# Patient Record
Sex: MALE | Race: WHITE | NOT HISPANIC OR LATINO | Employment: FULL TIME | URBAN - METROPOLITAN AREA
[De-identification: names, ages, dates, MRNs, and addresses within clinical notes are randomized per-mention and may not be internally consistent; named-entity substitution may affect disease eponyms.]

---

## 2017-08-18 ENCOUNTER — CLINICAL SUPPORT (OUTPATIENT)
Dept: URGENT CARE | Facility: CLINIC | Age: 22
End: 2017-08-18
Payer: COMMERCIAL

## 2017-08-18 DIAGNOSIS — Z23 IMMUNIZATION DUE: Primary | ICD-10-CM

## 2017-08-18 PROCEDURE — 90746 HEPB VACCINE 3 DOSE ADULT IM: CPT | Mod: S$GLB,,,

## 2017-09-04 ENCOUNTER — HOSPITAL ENCOUNTER (EMERGENCY)
Facility: HOSPITAL | Age: 22
Discharge: HOME | End: 2017-09-04
Attending: EMERGENCY MEDICINE
Payer: COMMERCIAL

## 2017-09-04 VITALS
SYSTOLIC BLOOD PRESSURE: 129 MMHG | HEART RATE: 98 BPM | RESPIRATION RATE: 18 BRPM | OXYGEN SATURATION: 98 % | DIASTOLIC BLOOD PRESSURE: 59 MMHG | HEIGHT: 70 IN | TEMPERATURE: 99 F | BODY MASS INDEX: 22.9 KG/M2 | WEIGHT: 160 LBS

## 2017-09-04 DIAGNOSIS — L05.01 PILONIDAL CYST WITH ABSCESS: Primary | ICD-10-CM

## 2017-09-04 PROCEDURE — 25000003 PHARM REV CODE 250: Performed by: PHYSICIAN ASSISTANT

## 2017-09-04 PROCEDURE — 87070 CULTURE OTHR SPECIMN AEROBIC: CPT

## 2017-09-04 PROCEDURE — 10080 I&D PILONIDAL CYST SIMPLE: CPT

## 2017-09-04 PROCEDURE — 99284 EMERGENCY DEPT VISIT MOD MDM: CPT | Mod: 25

## 2017-09-04 RX ORDER — SULFAMETHOXAZOLE AND TRIMETHOPRIM 800; 160 MG/1; MG/1
1 TABLET ORAL 2 TIMES DAILY
Qty: 15 TABLET | Refills: 0 | Status: SHIPPED | OUTPATIENT
Start: 2017-09-04 | End: 2017-09-11

## 2017-09-04 RX ORDER — ONDANSETRON 4 MG/1
4 TABLET, ORALLY DISINTEGRATING ORAL EVERY 6 HOURS PRN
Qty: 10 TABLET | Refills: 0 | OUTPATIENT
Start: 2017-09-04 | End: 2020-01-02

## 2017-09-04 RX ORDER — ONDANSETRON 4 MG/1
4 TABLET, ORALLY DISINTEGRATING ORAL EVERY 6 HOURS PRN
Qty: 10 TABLET | Refills: 0 | Status: SHIPPED | OUTPATIENT
Start: 2017-09-04 | End: 2017-09-04

## 2017-09-04 RX ORDER — OXYCODONE AND ACETAMINOPHEN 5; 325 MG/1; MG/1
1 TABLET ORAL EVERY 4 HOURS PRN
Qty: 15 TABLET | Refills: 0 | Status: SHIPPED | OUTPATIENT
Start: 2017-09-04

## 2017-09-04 RX ORDER — OXYCODONE AND ACETAMINOPHEN 5; 325 MG/1; MG/1
1 TABLET ORAL
Status: COMPLETED | OUTPATIENT
Start: 2017-09-04 | End: 2017-09-04

## 2017-09-04 RX ORDER — LIDOCAINE HYDROCHLORIDE 10 MG/ML
10 INJECTION INFILTRATION; PERINEURAL
Status: COMPLETED | OUTPATIENT
Start: 2017-09-04 | End: 2017-09-04

## 2017-09-04 RX ADMIN — Medication 5 ML: at 02:09

## 2017-09-04 RX ADMIN — OXYCODONE HYDROCHLORIDE AND ACETAMINOPHEN 1 TABLET: 5; 325 TABLET ORAL at 03:09

## 2017-09-04 RX ADMIN — LIDOCAINE HYDROCHLORIDE 10 ML: 10 INJECTION, SOLUTION INFILTRATION; PERINEURAL at 03:09

## 2017-09-04 NOTE — ED PROVIDER NOTES
Encounter Date: 9/4/2017       History     Chief Complaint   Patient presents with    Abscess     diagnosed with pilonidal cyst between buttocks 2 days ago and told to come to ED if pain increased.  Pain has increased and patient has difficulty sitting or walking.  Denies fever or drainage     Eldon Donald, a 22 y.o. male that presents to the ED for evaluation of possible pilonidal cyst.  He was seen in urgent care facility 2 days ago and informed that he had a pilonidal cyst in this area.  He was instructed to take anti-inflammatories and monitor for any increased swelling, redness or pain.  He states that he has had a significant amount of increased pain to this area with no drainage or fever.  He denies any history of previous pilonidal cyst.  No treatments tried.        The history is provided by the patient.     Review of patient's allergies indicates:  No Known Allergies  Past Medical History:   Diagnosis Date    PUD (peptic ulcer disease)      History reviewed. No pertinent surgical history.  Family History   Problem Relation Age of Onset    Recurrent abdominal pain Father     Cancer Maternal Grandmother      lung    Diabetes Maternal Grandfather      Social History   Substance Use Topics    Smoking status: Former Smoker     Types: Cigarettes    Smokeless tobacco: Former User     Quit date: 6/15/2016    Alcohol use Yes     Review of Systems   Constitutional: Negative for fever.   Respiratory: Negative for shortness of breath.    Cardiovascular: Negative for chest pain.   Gastrointestinal: Negative for nausea.   Skin: Positive for color change.   Allergic/Immunologic: Negative for immunocompromised state.   Neurological: Negative for dizziness.   Hematological: Negative for adenopathy.   Psychiatric/Behavioral: Negative for agitation and confusion.   All other systems reviewed and are negative.      Physical Exam     Initial Vitals [09/04/17 1320]   BP Pulse Resp Temp SpO2   129/79 96 16 98.6 °F (37  °C) 96 %      MAP       95.67         Physical Exam    Nursing note and vitals reviewed.  Constitutional: He appears well-developed and well-nourished.   HENT:   Head: Normocephalic and atraumatic.   Right Ear: External ear normal.   Left Ear: External ear normal.   Nose: Nose normal.   Mouth/Throat: Oropharynx is clear and moist.   Eyes: EOM are normal.   Neck: Normal range of motion. Neck supple.   Cardiovascular: Normal rate and regular rhythm.   Pulmonary/Chest: Breath sounds normal. No respiratory distress.   Abdominal: Soft. Bowel sounds are normal.   Musculoskeletal: Normal range of motion. He exhibits no edema or tenderness.   Lymphadenopathy:     He has no cervical adenopathy.   Neurological: He is alert and oriented to person, place, and time. No cranial nerve deficit.   Skin: Skin is warm and dry. Abscess noted. No rash noted. No erythema.        Psychiatric: He has a normal mood and affect. Thought content normal.         ED Course   I & D - Incision and Drainage  Date/Time: 9/4/2017 5:55 PM  Performed by: UMA WARD  Authorized by: SATHISH JOHANSEN   Consent Done: Yes  Patient identity confirmed: verbally with patient  Type: abscess  Anesthesia: local infiltration    Anesthesia:  Local Anesthetic: lidocaine 1% without epinephrine  Anesthetic total: 5 mL  Patient sedated: no  Scalpel size: 11  Incision type: single straight  Complexity: simple  Drainage: pus and  bloody  Drainage amount: moderate  Wound treatment: incision,  wound left open,  wound packed and  deloculation  Packing material: 1/4 in gauze  Complications: No  Specimens: No  Implants: No        Labs Reviewed - No data to display          Medical Decision Making:   Initial Assessment:   Concern for pilonidal cyst  Differential Diagnosis:   Abscess, pilonidal cyst, folliculitis  ED Management:  Patient presents to ED in mild distress, afebrile and nontoxic.  He is to DPD gluteal cleft area which shows some increased erythema and  fluctuance.  Percocet was given for comfort.  Area was I&D with expression of moderate amount of pus and blood.  Packing was placed.  Patient was instructed to follow-up in 2 days to have packing removed.  He was given information on wound care and will be placed on antibiotics.  Strict return precautions given and patient verbalized understanding.    RX: bactrim, percocet                    ED Course      Clinical Impression:   The encounter diagnosis was Pilonidal cyst with abscess.                           Peyton Lamar PA-C  09/04/17 2755

## 2017-09-06 ENCOUNTER — HOSPITAL ENCOUNTER (EMERGENCY)
Facility: HOSPITAL | Age: 22
Discharge: HOME | End: 2017-09-06
Attending: EMERGENCY MEDICINE
Payer: COMMERCIAL

## 2017-09-06 VITALS
RESPIRATION RATE: 16 BRPM | TEMPERATURE: 99 F | HEIGHT: 70 IN | OXYGEN SATURATION: 99 % | BODY MASS INDEX: 22.9 KG/M2 | WEIGHT: 160 LBS | DIASTOLIC BLOOD PRESSURE: 59 MMHG | SYSTOLIC BLOOD PRESSURE: 115 MMHG | HEART RATE: 68 BPM

## 2017-09-06 DIAGNOSIS — Z51.89 WOUND CHECK, ABSCESS: Primary | ICD-10-CM

## 2017-09-06 LAB — BACTERIA SPEC AEROBE CULT: NORMAL

## 2017-09-06 PROCEDURE — 99281 EMR DPT VST MAYX REQ PHY/QHP: CPT

## 2017-09-06 NOTE — ED NOTES
Pt seen 2 days ago in ED for pilondial cyst.  I & D performed with packing inserted.  Pt returns to ED to have packing removed and area re-assessed.

## 2017-09-06 NOTE — ED PROVIDER NOTES
Encounter Date: 9/6/2017       History     Chief Complaint   Patient presents with    Wound Check     from two days ago with packing        Eldon Donald 22 y.o. afebrile male presented to the ED with mean for wound recheck.  He was seen in this ED 2 days ago with I&D of pilonidal abscess.  He reports moderate decrease in pain and swelling of the area.  He does note some continued drainage from the site.  He denies any fever, chills, pain radiation or adverse reactions to current medication regimen.  He did schedule a follow-up appointment with surgeon which will be in 2 days.      The history is provided by the patient.     Review of patient's allergies indicates:  No Known Allergies  Past Medical History:   Diagnosis Date    PUD (peptic ulcer disease)      History reviewed. No pertinent surgical history.  Family History   Problem Relation Age of Onset    Recurrent abdominal pain Father     Cancer Maternal Grandmother      lung    Diabetes Maternal Grandfather      Social History   Substance Use Topics    Smoking status: Former Smoker     Types: Cigarettes    Smokeless tobacco: Former User     Quit date: 6/15/2016    Alcohol use Yes     Review of Systems   Constitutional: Negative for activity change, chills and fever.   HENT: Negative for sore throat.    Gastrointestinal: Negative for abdominal pain, nausea and vomiting.   Genitourinary: Negative for dysuria, genital sores and testicular pain.   Musculoskeletal: Negative for arthralgias, back pain, gait problem and joint swelling.   Skin: Positive for wound (abscess of the pilonidal region with packing in place). Negative for rash.   Neurological: Negative for weakness and numbness.   Hematological: Negative for adenopathy. Does not bruise/bleed easily.       Physical Exam     Initial Vitals [09/06/17 1411]   BP Pulse Resp Temp SpO2   (!) 115/59 68 16 98.9 °F (37.2 °C) 99 %      MAP       77.67         Physical Exam    Nursing note and vitals  reviewed.  Constitutional: Vital signs are normal. He appears well-developed and well-nourished. He is cooperative.  Non-toxic appearance. He does not appear ill. No distress.   HENT:   Head: Normocephalic and atraumatic.   Eyes: Conjunctivae and lids are normal.   Neck: Normal range of motion.   Cardiovascular: Normal rate.   Pulmonary/Chest: No respiratory distress.   Abdominal: Soft. Normal appearance.   Neurological: He is alert and oriented to person, place, and time. GCS eye subscore is 4. GCS verbal subscore is 5. GCS motor subscore is 6.   Skin: Skin is warm, dry and intact. Abscess noted. No rash noted.        Circled region represents Incision noted to the proximal gluteal cleft with packing in place an moderate purulent material.  Ample opening but as location of abscess to the pilonidal region we will repack.  No extending erythema, fluctuance or abnormalities   Psychiatric: He has a normal mood and affect. His speech is normal and behavior is normal. Thought content normal.         ED Course   Procedures  Labs Reviewed - No data to display     Eldon Weshayy 22 y.o. afebrile male presented to the ED with mean for wound recheck.  He was seen in this ED 2 days ago with I&D of pilonidal abscess.  He reports moderate decrease in pain and swelling of the area.  He does note some continued drainage from the site.  He denies any fever, chills, pain radiation or adverse reactions to current medication regimen.  He did schedule a follow-up appointment with surgeon which will be in 2 days. ROS positive for abscess wound check.  Physical exam reveals patient in no obvious distress Circled region represents Incision noted to the proximal gluteal cleft with packing in place an moderate purulent material.  Ample opening but as location of abscess to the pilonidal region we will repack.  No extending erythema, fluctuance or abnormalities. FROM of all joints on affected extremities, sensation and capillary refill intact.      DDX: abscess, cellulitis, folliculitis    ED management: Packing is replaced in the ED with instructions to continue antibiotics and pain medication as directed.  Encouraged to keep follow-up appointment with Dr. Franks      Impression/Plan: The encounter diagnosis was Wound check, abscess. Patient cautioned on when to return to ED.  Pt. Understands and agrees with current treatment plan                      Attending Attestation:     Physician Attestation Statement for NP/PA:   I discussed this assessment and plan of this patient with the NP/PA, but I did not personally examine the patient. The face to face encounter was performed by the NP/PA.    Other NP/PA Attestation Additions:    History of Present Illness: 22M presents for check of abscess; had pilonidal abscess I&D 2 days ago    Medical Decision Making: Healing well - cont abx                 ED Course      Clinical Impression:   The encounter diagnosis was Wound check, abscess.                           RANDI Gibbs  09/06/17 1554       Magi Cardona MD  09/28/17 1998

## 2017-09-07 ENCOUNTER — OFFICE VISIT (OUTPATIENT)
Dept: NEUROLOGY | Facility: HOSPITAL | Age: 22
End: 2017-09-07
Attending: SURGERY
Payer: COMMERCIAL

## 2017-09-07 VITALS
WEIGHT: 167 LBS | HEART RATE: 64 BPM | TEMPERATURE: 97 F | RESPIRATION RATE: 18 BRPM | HEIGHT: 70 IN | BODY MASS INDEX: 23.91 KG/M2 | DIASTOLIC BLOOD PRESSURE: 58 MMHG | SYSTOLIC BLOOD PRESSURE: 100 MMHG

## 2017-09-07 DIAGNOSIS — L05.92 PILONIDAL SINUS: Primary | ICD-10-CM

## 2017-09-07 PROCEDURE — 99214 OFFICE O/P EST MOD 30 MIN: CPT | Performed by: SURGERY

## 2017-09-07 NOTE — PROGRESS NOTES
"[facility]:  [facility]  A collaboration between [facility] and [facility]      PATIENT: Eldon Donald  MRN: [id number]  DATE: 9/7/2017    Subjective:      Chief Complaint: Follow-up (post op )  started to have back pain 5 days back and came to ER because of worsening pain at the tail pain. ewas drained in ER 3 days ago, here for f/u  Feeling better after drainage  Vitals:   Vitals:    09/07/17 1208   BP: (!) 100/58   Pulse: 64   Resp: 18   Temp: 97.4 °F (36.3 °C)   TempSrc: Oral   Weight: 75.8 kg (167 lb)   Height: 5' 10" (1.778 m)        Karnofsky Score:     Diagnosis: No diagnosis found.     Oncologic History:   Interval History:     Past Medical History:  Past Medical History:   Diagnosis Date    PUD (peptic ulcer disease)        Past Surgical History:  Past Surgical History:   Procedure Laterality Date    polynoidal cyst  09/2017       Family History:  Family History   Problem Relation Age of Onset    Recurrent abdominal pain Father     Cancer Maternal Grandmother      lung    Diabetes Maternal Grandfather        Allergies:  Review of patient's allergies indicates:  No Known Allergies    Medications:  Current Outpatient Prescriptions   Medication Sig Dispense Refill    hyoscyamine (ANASPAZ,LEVSIN) 0.125 mg Tab Take 1 tablet (125 mcg total) by mouth every 6 (six) hours as needed. 90 tablet 6    sulfamethoxazole-trimethoprim 800-160mg (BACTRIM DS) 800-160 mg Tab Take 1 tablet by mouth 2 (two) times daily. 15 tablet 0    ondansetron (ZOFRAN-ODT) 4 MG TbDL Take 1 tablet (4 mg total) by mouth every 6 (six) hours as needed. 10 tablet 0    oxycodone-acetaminophen (PERCOCET) 5-325 mg per tablet Take 1 tablet by mouth every 4 (four) hours as needed for Pain. 15 tablet 0    pantoprazole (PROTONIX) 40 MG tablet Take 1 tablet (40 mg total) by mouth 2 (two) times daily. 60 tablet 4     No current facility-administered " medications for this visit.        Review of Systems   Constitutional: Negative for activity change, appetite change, chills, diaphoresis, fatigue, fever and unexpected weight change.   HENT: Negative for congestion, dental problem, drooling, ear pain, facial swelling, hearing loss, mouth sores, nosebleeds, postnasal drip, rhinorrhea, sinus pain, sinus pressure, sneezing, sore throat, tinnitus and trouble swallowing.    Eyes: Positive for visual disturbance. Negative for photophobia, pain, discharge, redness and itching.   Respiratory: Negative for cough, choking, chest tightness, shortness of breath, wheezing and stridor.    Cardiovascular: Negative for chest pain, palpitations and leg swelling.   Gastrointestinal: Negative for abdominal distention, abdominal pain and anal bleeding.   Endocrine: Negative for cold intolerance, heat intolerance, polydipsia and polyphagia.   Genitourinary: Negative for decreased urine volume, difficulty urinating, dysuria, enuresis, flank pain, frequency, genital sores, hematuria, penile swelling, scrotal swelling, testicular pain and urgency.   Musculoskeletal: Negative for back pain and gait problem.   Allergic/Immunologic: Negative.    Neurological: Negative for tremors, seizures, syncope, facial asymmetry, speech difficulty, weakness, light-headedness, numbness and headaches.   Hematological: Negative.    Psychiatric/Behavioral: Negative.       Objective:      Physical Exam   Constitutional: He is oriented to person, place, and time. He appears well-developed and well-nourished.   HENT:   Head: Atraumatic.   Eyes: EOM are normal.   Neck: Neck supple.   Cardiovascular: Normal rate and regular rhythm.    Pulmonary/Chest: Effort normal and breath sounds normal.   Abdominal: Soft. Bowel sounds are normal.   Musculoskeletal: Normal range of motion.   Pilonidal sinus area looks ok no erytema drianing serous fluid, no more swelling or collection   Neurological: He is alert and oriented  to person, place, and time.   Skin: Skin is warm.      Assessment:       No diagnosis found.    Laboratory Data:  Pilonidal sinus abscesssdr ained    Stable now    Impression:   Plan:         1. Daily shower  2. Dressing changes instructed  3. F/u 3 months    Discussed about pilonidal disease, treatment options    To come if he develops swelling area. Informed him about the recurrence rate of 40%                  MARKO Maria MD, FACS   Associate Professor of Surgery, [facility]   Neuroendocrine Surgery, Hepatic/Pancreatic & General Surgery   [address], [address]   [place] [address]   ph. [phone]; [phone]   fax. [phone]

## 2020-01-02 ENCOUNTER — HOSPITAL ENCOUNTER (EMERGENCY)
Facility: HOSPITAL | Age: 25
Discharge: HOME | End: 2020-01-02
Attending: EMERGENCY MEDICINE
Payer: COMMERCIAL

## 2020-01-02 VITALS
BODY MASS INDEX: 23.34 KG/M2 | OXYGEN SATURATION: 99 % | RESPIRATION RATE: 17 BRPM | DIASTOLIC BLOOD PRESSURE: 81 MMHG | SYSTOLIC BLOOD PRESSURE: 136 MMHG | HEART RATE: 94 BPM | WEIGHT: 163 LBS | HEIGHT: 70 IN | TEMPERATURE: 99 F

## 2020-01-02 DIAGNOSIS — K92.0 HEMATEMESIS WITH NAUSEA: Primary | ICD-10-CM

## 2020-01-02 DIAGNOSIS — R05.9 COUGH: ICD-10-CM

## 2020-01-02 LAB
ANION GAP SERPL CALC-SCNC: 12 MMOL/L (ref 8–16)
BASOPHILS # BLD AUTO: 0.03 K/UL (ref 0–0.2)
BASOPHILS NFR BLD: 0.4 % (ref 0–1.9)
BUN SERPL-MCNC: 8 MG/DL (ref 6–20)
CALCIUM SERPL-MCNC: 9.8 MG/DL (ref 8.7–10.5)
CHLORIDE SERPL-SCNC: 104 MMOL/L (ref 95–110)
CO2 SERPL-SCNC: 25 MMOL/L (ref 23–29)
CREAT SERPL-MCNC: 0.9 MG/DL (ref 0.5–1.4)
DIFFERENTIAL METHOD: ABNORMAL
EOSINOPHIL # BLD AUTO: 0.2 K/UL (ref 0–0.5)
EOSINOPHIL NFR BLD: 2.4 % (ref 0–8)
ERYTHROCYTE [DISTWIDTH] IN BLOOD BY AUTOMATED COUNT: 13.1 % (ref 11.5–14.5)
EST. GFR  (AFRICAN AMERICAN): >60 ML/MIN/1.73 M^2
EST. GFR  (NON AFRICAN AMERICAN): >60 ML/MIN/1.73 M^2
GLUCOSE SERPL-MCNC: 92 MG/DL (ref 70–110)
HCT VFR BLD AUTO: 47.8 % (ref 40–54)
HGB BLD-MCNC: 16.1 G/DL (ref 14–18)
LACTATE SERPL-SCNC: 0.8 MMOL/L (ref 0.5–2.2)
LYMPHOCYTES # BLD AUTO: 2.1 K/UL (ref 1–4.8)
LYMPHOCYTES NFR BLD: 24.3 % (ref 18–48)
MCH RBC QN AUTO: 30.7 PG (ref 27–31)
MCHC RBC AUTO-ENTMCNC: 33.7 G/DL (ref 32–36)
MCV RBC AUTO: 91 FL (ref 82–98)
MONOCYTES # BLD AUTO: 1.6 K/UL (ref 0.3–1)
MONOCYTES NFR BLD: 18.8 % (ref 4–15)
NEUTROPHILS # BLD AUTO: 4.6 K/UL (ref 1.8–7.7)
NEUTROPHILS NFR BLD: 54.1 % (ref 38–73)
PLATELET # BLD AUTO: 250 K/UL (ref 150–350)
PMV BLD AUTO: 10.9 FL (ref 9.2–12.9)
POTASSIUM SERPL-SCNC: 3.6 MMOL/L (ref 3.5–5.1)
RBC # BLD AUTO: 5.25 M/UL (ref 4.6–6.2)
SODIUM SERPL-SCNC: 141 MMOL/L (ref 136–145)
WBC # BLD AUTO: 8.46 K/UL (ref 3.9–12.7)

## 2020-01-02 PROCEDURE — 25000003 PHARM REV CODE 250: Performed by: PHYSICIAN ASSISTANT

## 2020-01-02 PROCEDURE — 85025 COMPLETE CBC W/AUTO DIFF WBC: CPT

## 2020-01-02 PROCEDURE — 99284 EMERGENCY DEPT VISIT MOD MDM: CPT | Mod: 25

## 2020-01-02 PROCEDURE — 83605 ASSAY OF LACTIC ACID: CPT

## 2020-01-02 PROCEDURE — 80048 BASIC METABOLIC PNL TOTAL CA: CPT

## 2020-01-02 RX ORDER — ONDANSETRON 4 MG/1
4 TABLET, ORALLY DISINTEGRATING ORAL EVERY 8 HOURS PRN
Qty: 10 TABLET | Refills: 0 | Status: SHIPPED | OUTPATIENT
Start: 2020-01-02

## 2020-01-02 RX ADMIN — LIDOCAINE HYDROCHLORIDE: 20 SOLUTION ORAL; TOPICAL at 09:01

## 2020-01-03 NOTE — ED TRIAGE NOTES
Pt presents to ED and reports coughing with onset in September with nasal congestion. Pt states he was seen  By ENT. He states he began with hematemesis today. Pt states pt had concerns.

## 2020-01-03 NOTE — ED PROVIDER NOTES
Encounter Date: 1/2/2020       History     Chief Complaint   Patient presents with    Cough     Reports cough and congestion since the end of November. Reports had some hematemesis today prompting him to come to ED for eval.      24-year-old male with history of peptic ulcer disease presents to the ER with chief complaint of vomiting small amount of bright red blood just prior to arrival.  Patient reports that he has been having coughing and dry heaving for 3 months.  He was seen by ENT and put on nasal spray, antibiotics and antacid with temporary improvement of his symptoms. Patient reports chronic vomiting in the morning for as long as I can remember.  He denies any black or bloody stool.  He denies fever, abdominal pain, chest pain or shortness of breath, dizziness, lightheadedness, or any other complaints at this time.        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   HENT: Negative for sore throat.    Respiratory: Positive for cough. Negative for shortness of breath.    Cardiovascular: Negative for chest pain.   Gastrointestinal: Positive for nausea and vomiting. Negative for abdominal pain and blood in stool.   Genitourinary: Negative for dysuria.   Musculoskeletal: Negative for back pain.   Skin: Negative for rash.   Neurological: Negative for dizziness, syncope,  weakness and light-headedness.   Hematological: Does not bruise/bleed easily.   Psychiatric/Behavioral: Negative for confusion.       Physical Exam     Initial Vitals [01/02/20 1836]   BP Pulse Resp Temp SpO2   (!) 127/90 92 18 99.3 °F (37.4 °C) 95 %      MAP       --         Physical Exam    Nursing note and vitals reviewed.  Constitutional: He appears well-developed and well-nourished.   HENT:   Head: Atraumatic.   Nose: Rhinorrhea present. No sinus tenderness.   Mouth/Throat: Oropharynx is clear and moist.   Eyes: Conjunctivae and EOM are normal. Pupils are equal, round, and reactive to light.   Neck: Normal range of motion. Neck supple.   Cardiovascular: Normal rate, regular rhythm and intact distal pulses.   Pulmonary/Chest: Breath sounds normal. No respiratory distress. He has no wheezes. He has no rhonchi. He has no rales.   Abdominal: Soft. Bowel sounds are normal. He exhibits no distension. There is no tenderness. There is no rebound and no guarding.   Neurological: He is alert and oriented to person, place, and time. He has normal strength.   Skin: No rash noted.   Psychiatric: He has a normal mood and affect.         ED Course   Procedures  Labs Reviewed   CBC W/ AUTO DIFFERENTIAL - Abnormal; Notable for the following components:       Result Value    Mono # 1.6 (*)     Mono% 18.8 (*)     All other components within normal limits   BASIC METABOLIC PANEL   LACTIC ACID, PLASMA          Imaging Results          X-Ray Chest PA And Lateral (Final result)  Result time 01/02/20 19:43:49    Final result by Landon Ryan MD (01/02/20 19:43:49)                 Impression:      No active cardiopulmonary disease      Electronically signed by: Landon Ryan MD  Date:    01/02/2020  Time:    19:43             Narrative:    EXAMINATION:  XR CHEST PA AND LATERAL    CLINICAL HISTORY:  Cough    TECHNIQUE:  PA and lateral views of the chest were performed.    COMPARISON:  None    FINDINGS:  Heart size and pulmonary vascularity  are within normal limits.  No hilar or mediastinal enlargement.  Lungs are well expanded and appear free of active disease.  No pleural fluid or pneumothorax.  Soft tissues and osseous structures are unremarkable.                                       APC / Resident Notes:   Patient presents to the ER with chief complaint of 1 episode of hematemesis with small amount of bright red blood just prior to arrival.  No additional vomiting today.  Patient reports chronic intermittent vomiting. He does admit to marijuana use, but states that he discontinued for 9 months and still had vomiting.  I have counseled on cessation of marijuana use.  Patient denies abdominal pain.  He reports cough and nasal congestion ongoing for 2 months.  He is scheduled for follow-up exam with ENT in 4 days.  He was recently treated with nasal spray and antibiotics.  Chest x-ray today is negative for acute or infectious process.  Blood work is unremarkable.  He is hemodynamically stable and lactic acid is normal.  He denies black or bloody stool and I do not suspect bleeding ulcer at this time.  He is given GI cocktail in the ER.  He feels significantly improved.  I will prescribe Zofran to take as needed for nausea and vomiting, I have also advised that he use Maalox and Pepcid over-the-counter.  I have advised close follow-up with PCP and GI for further evaluation as he may need repeat upper GI scope.  He had last GI scope in 2016, there was evidence of healed MW tear at that time and he was placed on protonix at that time.  Patient is comfortable with this plan.  He is given ER return precautions.  I discussed the care this patient with my supervising physician.                            Clinical Impression:       ICD-10-CM ICD-9-CM   1. Hematemesis with nausea K92.0 578.0     787.02   2. Cough R05 786.2                             RANDI Woodson  01/02/20 8329

## 2020-01-03 NOTE — ED PROVIDER NOTES
Sort note: Eldon Donald nontoxic/afebrile 24 y.o.  presented to the ED with c/o cough and congestion since November.  Pt had specks of blood in emesis today which prompted ED visit.  No abd pain. Pmhx of ulcers.     Patient seen and medically screened by Nurse practitioner in Sort process due to ED crowding.  Appropriate tests and/or medications ordered.  Care transferred to an alternate provider when patient was placed in an Exam Room from the [facility] for physical exam, additional orders, and disposition. 6:59 PM. DELICIA Thomas, STEFFANIE  01/02/20 8375

## 2021-04-12 ENCOUNTER — OFFICE VISIT (OUTPATIENT)
Dept: URGENT CARE | Facility: CLINIC | Age: 26
End: 2021-04-12

## 2021-04-12 VITALS
BODY MASS INDEX: 23.62 KG/M2 | OXYGEN SATURATION: 99 % | HEIGHT: 70 IN | SYSTOLIC BLOOD PRESSURE: 109 MMHG | WEIGHT: 165 LBS | DIASTOLIC BLOOD PRESSURE: 72 MMHG | TEMPERATURE: 99 F | HEART RATE: 69 BPM | RESPIRATION RATE: 18 BRPM

## 2021-04-12 DIAGNOSIS — Z20.822 COVID-19 RULED OUT: Primary | ICD-10-CM

## 2021-04-12 DIAGNOSIS — Z20.822 CLOSE EXPOSURE TO COVID-19 VIRUS: ICD-10-CM

## 2021-04-12 LAB
CTP QC/QA: YES
SARS-COV-2 RDRP RESP QL NAA+PROBE: NEGATIVE

## 2021-04-12 PROCEDURE — U0002: ICD-10-PCS | Mod: QW,TIER,S$GLB, | Performed by: NURSE PRACTITIONER

## 2021-04-12 PROCEDURE — 99214 OFFICE O/P EST MOD 30 MIN: CPT | Mod: TIER,S$GLB,, | Performed by: NURSE PRACTITIONER

## 2021-04-12 PROCEDURE — 99214 PR OFFICE/OUTPT VISIT, EST, LEVL IV, 30-39 MIN: ICD-10-PCS | Mod: TIER,S$GLB,, | Performed by: NURSE PRACTITIONER

## 2021-04-12 PROCEDURE — U0002 COVID-19 LAB TEST NON-CDC: HCPCS | Mod: QW,TIER,S$GLB, | Performed by: NURSE PRACTITIONER

## 2021-04-14 ENCOUNTER — CLINICAL SUPPORT (OUTPATIENT)
Dept: URGENT CARE | Facility: CLINIC | Age: 26
End: 2021-04-14

## 2021-04-14 DIAGNOSIS — Z11.52 ENCOUNTER FOR SCREENING LABORATORY TESTING FOR COVID-19 VIRUS: Primary | ICD-10-CM

## 2021-04-14 LAB
CTP QC/QA: YES
SARS-COV-2 RDRP RESP QL NAA+PROBE: NEGATIVE

## 2021-04-14 PROCEDURE — U0002: ICD-10-PCS | Mod: QW,S$GLB,, | Performed by: NURSE PRACTITIONER

## 2021-04-14 PROCEDURE — U0002 COVID-19 LAB TEST NON-CDC: HCPCS | Mod: QW,S$GLB,, | Performed by: NURSE PRACTITIONER

## 2021-04-16 ENCOUNTER — PATIENT MESSAGE (OUTPATIENT)
Dept: RESEARCH | Facility: HOSPITAL | Age: 26
End: 2021-04-16

## 2022-01-27 ENCOUNTER — OFFICE VISIT (OUTPATIENT)
Dept: URGENT CARE | Facility: CLINIC | Age: 27
End: 2022-01-27
Payer: MEDICAID

## 2022-01-27 VITALS
DIASTOLIC BLOOD PRESSURE: 77 MMHG | BODY MASS INDEX: 23.7 KG/M2 | HEART RATE: 100 BPM | TEMPERATURE: 97 F | SYSTOLIC BLOOD PRESSURE: 124 MMHG | WEIGHT: 160 LBS | HEIGHT: 69 IN | OXYGEN SATURATION: 98 % | RESPIRATION RATE: 17 BRPM

## 2022-01-27 DIAGNOSIS — M79.641 RIGHT HAND PAIN: ICD-10-CM

## 2022-01-27 DIAGNOSIS — S69.91XA HAND TRAUMA, RIGHT, INITIAL ENCOUNTER: Primary | ICD-10-CM

## 2022-01-27 PROCEDURE — 1160F RVW MEDS BY RX/DR IN RCRD: CPT | Mod: CPTII,S$GLB,, | Performed by: PHYSICIAN ASSISTANT

## 2022-01-27 PROCEDURE — 3008F BODY MASS INDEX DOCD: CPT | Mod: CPTII,S$GLB,, | Performed by: PHYSICIAN ASSISTANT

## 2022-01-27 PROCEDURE — 73130 X-RAY EXAM OF HAND: CPT | Mod: FY,RT,S$GLB, | Performed by: RADIOLOGY

## 2022-01-27 PROCEDURE — 1160F PR REVIEW ALL MEDS BY PRESCRIBER/CLIN PHARMACIST DOCUMENTED: ICD-10-PCS | Mod: CPTII,S$GLB,, | Performed by: PHYSICIAN ASSISTANT

## 2022-01-27 PROCEDURE — 1159F MED LIST DOCD IN RCRD: CPT | Mod: CPTII,S$GLB,, | Performed by: PHYSICIAN ASSISTANT

## 2022-01-27 PROCEDURE — 3078F PR MOST RECENT DIASTOLIC BLOOD PRESSURE < 80 MM HG: ICD-10-PCS | Mod: CPTII,S$GLB,, | Performed by: PHYSICIAN ASSISTANT

## 2022-01-27 PROCEDURE — 99213 PR OFFICE/OUTPT VISIT, EST, LEVL III, 20-29 MIN: ICD-10-PCS | Mod: S$GLB,,, | Performed by: PHYSICIAN ASSISTANT

## 2022-01-27 PROCEDURE — 99213 OFFICE O/P EST LOW 20 MIN: CPT | Mod: S$GLB,,, | Performed by: PHYSICIAN ASSISTANT

## 2022-01-27 PROCEDURE — 73130 XR HAND COMPLETE 3 VIEW RIGHT: ICD-10-PCS | Mod: FY,RT,S$GLB, | Performed by: RADIOLOGY

## 2022-01-27 PROCEDURE — 3074F SYST BP LT 130 MM HG: CPT | Mod: CPTII,S$GLB,, | Performed by: PHYSICIAN ASSISTANT

## 2022-01-27 PROCEDURE — 3008F PR BODY MASS INDEX (BMI) DOCUMENTED: ICD-10-PCS | Mod: CPTII,S$GLB,, | Performed by: PHYSICIAN ASSISTANT

## 2022-01-27 PROCEDURE — 1159F PR MEDICATION LIST DOCUMENTED IN MEDICAL RECORD: ICD-10-PCS | Mod: CPTII,S$GLB,, | Performed by: PHYSICIAN ASSISTANT

## 2022-01-27 PROCEDURE — 3078F DIAST BP <80 MM HG: CPT | Mod: CPTII,S$GLB,, | Performed by: PHYSICIAN ASSISTANT

## 2022-01-27 PROCEDURE — 3074F PR MOST RECENT SYSTOLIC BLOOD PRESSURE < 130 MM HG: ICD-10-PCS | Mod: CPTII,S$GLB,, | Performed by: PHYSICIAN ASSISTANT

## 2022-01-27 NOTE — PROGRESS NOTES
"Subjective:       Patient ID: Eldon Donald is a 26 y.o. male.    Vitals:  height is 5' 9" (1.753 m) and weight is 72.6 kg (160 lb). His temporal temperature is 97.4 °F (36.3 °C). His blood pressure is 124/77 and his pulse is 100. His respiration is 17 and oxygen saturation is 98%.     Chief Complaint: Hand Injury    Pt reports that about two days he fell down and hold himself with his right hand. He says that he has applied ice and wrapped to keep the sweeling down.     Hand Injury   His dominant hand is their right hand. The incident occurred 5 to 7 days ago. The injury mechanism was a fall. The pain is present in the right hand. The quality of the pain is described as aching, cramping, shooting and stabbing. The pain radiates to the right hand. The pain is at a severity of 9/10. The pain is severe. The pain has been constant since the incident. Pertinent negatives include no numbness. The symptoms are aggravated by movement and palpation. He has tried ice, NSAIDs, rest and immobilization for the symptoms. The treatment provided no relief.       Musculoskeletal: Positive for pain and joint swelling.   Neurological: Negative for numbness.       Objective:      Physical Exam   Constitutional: He is oriented to person, place, and time. He appears well-developed and well-nourished. He is cooperative.  Non-toxic appearance. He does not appear ill. No distress.   HENT:   Head: Normocephalic and atraumatic.   Ears:   Right Ear: Hearing and external ear normal.   Left Ear: Hearing and external ear normal.   Nose: Nose normal. No mucosal edema, rhinorrhea or nasal deformity. No epistaxis. Right sinus exhibits no maxillary sinus tenderness and no frontal sinus tenderness. Left sinus exhibits no maxillary sinus tenderness and no frontal sinus tenderness.   Mouth/Throat: Uvula is midline, oropharynx is clear and moist and mucous membranes are normal. No trismus in the jaw. Normal dentition. No uvula swelling. No posterior " oropharyngeal erythema.   Eyes: Conjunctivae and lids are normal. Right eye exhibits no discharge. Left eye exhibits no discharge. No scleral icterus.   Neck: Trachea normal and phonation normal. Neck supple.   Cardiovascular: Normal rate, regular rhythm, intact distal pulses and normal pulses.   Pulmonary/Chest: Effort normal. No respiratory distress. He has no wheezes.   Abdominal: Normal appearance. He exhibits no distension and no mass. Soft. There is no abdominal tenderness.   Musculoskeletal: Normal range of motion.         General: No edema. Normal range of motion.        Hands:    Neurological: He is alert and oriented to person, place, and time. He exhibits normal muscle tone. Coordination normal.   Skin: Skin is warm, dry, intact, not diaphoretic and not pale.   Psychiatric: He has a normal mood and affect. His speech is normal and behavior is normal. Judgment and thought content normal.   Nursing note and vitals reviewed.    Results for orders placed or performed in visit on 04/14/21   POCT COVID-19 Rapid Screening   Result Value Ref Range    POC Rapid COVID Negative Negative     Acceptable Yes     XR HAND COMPLETE 3 VIEW RIGHT    Result Date: 1/27/2022  EXAMINATION: XR HAND COMPLETE 3 VIEW RIGHT CLINICAL HISTORY: Unspecified injury of right wrist, hand and finger(s), initial encounter TECHNIQUE: PA, lateral, and oblique views of the right hand were performed. COMPARISON: None. FINDINGS: Skeletal structures are intact without acute fracture or dislocation.  Shafts of the 4th and 5th metacarpal show orthopedic plates and screws presumably for old fractures that have healed.  Joint spaces are satisfactory.  Mild soft tissue swelling is present in the metacarpal region.     Soft tissue swelling.  No acute fracture or dislocation.  Orthopedic hardware 4th and 5th metacarpals. Electronically signed by: Kush De León MD Date:    01/27/2022 Time:    16:39         Assessment:       1. Hand  trauma, right, initial encounter    2. Right hand pain          Plan:         Hand trauma, right, initial encounter  -     XR HAND COMPLETE 3 VIEW RIGHT; Future; Expected date: 01/27/2022  -     Ambulatory referral/consult to Orthopedics    Right hand pain  -     Ambulatory referral/consult to Orthopedics     Follow up if symptoms worsen or fail to improve, for F/U with PCP or ED.   Patient Instructions   Patient Education       Hand Pain Discharge Instructions   About this topic   The hand is made up of many small bones. Cartilage covers the ends of the bones to help the joints glide easier. Ligaments are strong bands of tissue that hold your bones together. There are also some muscles and tendons in your hand. These attach to the bones and help move the hand up, down, or sideways. Nerves and blood vessels also run through your hand. There are layers of connective tissue in your hand. The skin on your palm is very thick. Damage or injury to any of these structures can lead to hand pain and problems.           What care is needed at home?   · Ask your doctor what you need to do when you go home. Make sure you ask questions if you do not understand what the doctor says.  · If you were given one, wear the splint or brace to support your hand. You may need to have surgery or get a cast after the swelling goes down.  · Prop your hand on pillows keeping it above the level of your heart. This may help lessen pain and swelling.  · You may want to take medicines like ibuprofen or naproxen for swelling and pain. These are nonsteroidal anti-inflammatory drugs (NSAIDs). You might also have gotten a prescription for stronger pain medicines to take for a short time. If so, be sure to follow the instructions for taking them.  · Ice may help you ease pain and swelling.  ? Place an ice pack or a bag of frozen vegetables wrapped in a towel over the painful part. Never put ice right on the skin. Do not leave the ice on more than 10  to 15 minutes at a time. Use for the first 24 to 48 hours after an injury.  · Heat may be used later but not right away. Heat can make swelling worse. If your doctor tells you to use heat, put a heating pad on your hand for no more than 20 minutes at a time. Never go to sleep with a heating pad on as this can cause burns.  · Change wound dressings if you have an open area. Your doctor will tell you how to do this.  · Take all drugs as ordered by your doctor.  · Do exercises that your doctor or therapist suggests.  · Gently massage the painful area of your hand if there is no new injury there.  What follow-up care is needed?   · Your doctors may ask you to make visits to the office to check on your progress. Be sure to keep these visits.  · Your doctor may send you to physical therapy (PT) or occupational therapy (OT) for treatments and exercises to help you heal faster.   · Your doctor may also send you to a hand specialist or surgeon.  What drugs may be needed?   The doctor may order drugs to:  · Help with pain and swelling, like ibuprofen. These are nonsteroidal anti-inflammatory drugs (NSAIDs).  · Help with pain, such as acetaminophen  · Prevent or fight an infection  · Treat a skin problem  The doctor may give you a shot of an anti-inflammatory drug called a corticosteroid. This will help with swelling. Talk with your doctor about the risks of this shot.  Will physical activity be limited?   You may need to rest your hand for a while. You should not do physical activity that makes your health problem worse. If you work out or play sports, you may not be able to do those things until your health problem gets better. Based on the problem, you may have to go to physical therapy (PT) or occupational therapy (OT) for a few weeks or months. You may have to wear a splint, brace, or cast for a few weeks. If you have surgery, recovery may take about 3 to 6 months before you can go back to normal activities.  What  problems could happen?   · Infection  · Loss of motion  · Loss of finger movement or strength  · Ongoing pain or stiffness   · Long-term disability  · Injury to nerves, blood vessels, or other tissues   · Poor healing  What can be done to prevent this health problem?   · Take rests often when doing something with repeat hand motions. Shake out your hands or rub them during breaks.  · Alternate between activities or tasks using repeat hand motions if possible.  · Do not keep your hand in the same position for long periods of time.  · When picking up heavy objects, use both hands together. Keep your wrists straight.  · Keep your fingers and hand moving, especially if you have arthritis. Not moving can cause stiffness and pain.  · Wear protective equipment when playing sports.  · Follow all safety precautions when running machinery.  · Do not run equipment or machines when tired.  · Do not wear rings when working with machinery.  · Use caution when cutting with knives. Make sure the blades stay sharp. Dull blades can slip and cause injuries.  · Do not approach fighting dogs or animals. Be careful when getting near an animal that you do not know.  When do I need to call the doctor?   · Your hand becomes swollen or starts to hurt more.  · Your cast becomes too tight and uncomfortable, or your fingers turn cold or blue.  · There is a bad smell or drainage coming from your cast.  · You damage your splint or cast.  · Your cast feels too loose.  · You notice a crack in your cast or it becomes soft.  · You have less feeling or movement in your fingers.  · The skin becomes red and irritated around the cast.  Teach Back: Helping You Understand   The Teach Back Method helps you understand the information we are giving you. After you talk with the staff, tell them in your own words what you learned. This helps to make sure the staff has described each thing clearly. It also helps to explain things that may have been confusing.  Before going home, make sure you can do these:  · I can tell you about my pain.  · I can tell you what may help ease my pain.  · I can tell you what I will do if I have numbness or tingling or my hand or fingers are cold or pale.  Last Reviewed Date   2021-09-01  Consumer Information Use and Disclaimer   This information is not specific medical advice and does not replace information you receive from your health care provider. This is only a brief summary of general information. It does NOT include all information about conditions, illnesses, injuries, tests, procedures, treatments, therapies, discharge instructions or life-style choices that may apply to you. You must talk with your health care provider for complete information about your health and treatment options. This information should not be used to decide whether or not to accept your health care providers advice, instructions or recommendations. Only your health care provider has the knowledge and training to provide advice that is right for you.  Copyright   Copyright © 2021 [employer] and its affiliates and/or licensors. All rights reserved.

## 2022-01-27 NOTE — PATIENT INSTRUCTIONS
Patient Education       Hand Pain Discharge Instructions   About this topic   The hand is made up of many small bones. Cartilage covers the ends of the bones to help the joints glide easier. Ligaments are strong bands of tissue that hold your bones together. There are also some muscles and tendons in your hand. These attach to the bones and help move the hand up, down, or sideways. Nerves and blood vessels also run through your hand. There are layers of connective tissue in your hand. The skin on your palm is very thick. Damage or injury to any of these structures can lead to hand pain and problems.           What care is needed at home?   · Ask your doctor what you need to do when you go home. Make sure you ask questions if you do not understand what the doctor says.  · If you were given one, wear the splint or brace to support your hand. You may need to have surgery or get a cast after the swelling goes down.  · Prop your hand on pillows keeping it above the level of your heart. This may help lessen pain and swelling.  · You may want to take medicines like ibuprofen or naproxen for swelling and pain. These are nonsteroidal anti-inflammatory drugs (NSAIDs). You might also have gotten a prescription for stronger pain medicines to take for a short time. If so, be sure to follow the instructions for taking them.  · Ice may help you ease pain and swelling.  ? Place an ice pack or a bag of frozen vegetables wrapped in a towel over the painful part. Never put ice right on the skin. Do not leave the ice on more than 10 to 15 minutes at a time. Use for the first 24 to 48 hours after an injury.  · Heat may be used later but not right away. Heat can make swelling worse. If your doctor tells you to use heat, put a heating pad on your hand for no more than 20 minutes at a time. Never go to sleep with a heating pad on as this can cause burns.  · Change wound dressings if you have an open area. Your doctor will tell you how to  do this.  · Take all drugs as ordered by your doctor.  · Do exercises that your doctor or therapist suggests.  · Gently massage the painful area of your hand if there is no new injury there.  What follow-up care is needed?   · Your doctors may ask you to make visits to the office to check on your progress. Be sure to keep these visits.  · Your doctor may send you to physical therapy (PT) or occupational therapy (OT) for treatments and exercises to help you heal faster.   · Your doctor may also send you to a hand specialist or surgeon.  What drugs may be needed?   The doctor may order drugs to:  · Help with pain and swelling, like ibuprofen. These are nonsteroidal anti-inflammatory drugs (NSAIDs).  · Help with pain, such as acetaminophen  · Prevent or fight an infection  · Treat a skin problem  The doctor may give you a shot of an anti-inflammatory drug called a corticosteroid. This will help with swelling. Talk with your doctor about the risks of this shot.  Will physical activity be limited?   You may need to rest your hand for a while. You should not do physical activity that makes your health problem worse. If you work out or play sports, you may not be able to do those things until your health problem gets better. Based on the problem, you may have to go to physical therapy (PT) or occupational therapy (OT) for a few weeks or months. You may have to wear a splint, brace, or cast for a few weeks. If you have surgery, recovery may take about 3 to 6 months before you can go back to normal activities.  What problems could happen?   · Infection  · Loss of motion  · Loss of finger movement or strength  · Ongoing pain or stiffness   · Long-term disability  · Injury to nerves, blood vessels, or other tissues   · Poor healing  What can be done to prevent this health problem?   · Take rests often when doing something with repeat hand motions. Shake out your hands or rub them during breaks.  · Alternate between activities  or tasks using repeat hand motions if possible.  · Do not keep your hand in the same position for long periods of time.  · When picking up heavy objects, use both hands together. Keep your wrists straight.  · Keep your fingers and hand moving, especially if you have arthritis. Not moving can cause stiffness and pain.  · Wear protective equipment when playing sports.  · Follow all safety precautions when running machinery.  · Do not run equipment or machines when tired.  · Do not wear rings when working with machinery.  · Use caution when cutting with knives. Make sure the blades stay sharp. Dull blades can slip and cause injuries.  · Do not approach fighting dogs or animals. Be careful when getting near an animal that you do not know.  When do I need to call the doctor?   · Your hand becomes swollen or starts to hurt more.  · Your cast becomes too tight and uncomfortable, or your fingers turn cold or blue.  · There is a bad smell or drainage coming from your cast.  · You damage your splint or cast.  · Your cast feels too loose.  · You notice a crack in your cast or it becomes soft.  · You have less feeling or movement in your fingers.  · The skin becomes red and irritated around the cast.  Teach Back: Helping You Understand   The Teach Back Method helps you understand the information we are giving you. After you talk with the staff, tell them in your own words what you learned. This helps to make sure the staff has described each thing clearly. It also helps to explain things that may have been confusing. Before going home, make sure you can do these:  · I can tell you about my pain.  · I can tell you what may help ease my pain.  · I can tell you what I will do if I have numbness or tingling or my hand or fingers are cold or pale.  Last Reviewed Date   2021-09-01  Consumer Information Use and Disclaimer   This information is not specific medical advice and does not replace information you receive from your health  care provider. This is only a brief summary of general information. It does NOT include all information about conditions, illnesses, injuries, tests, procedures, treatments, therapies, discharge instructions or life-style choices that may apply to you. You must talk with your health care provider for complete information about your health and treatment options. This information should not be used to decide whether or not to accept your health care providers advice, instructions or recommendations. Only your health care provider has the knowledge and training to provide advice that is right for you.  Copyright   Copyright © 2021 [employer] and its affiliates and/or licensors. All rights reserved.

## 2023-07-31 ENCOUNTER — HOSPITAL ENCOUNTER (EMERGENCY)
Facility: HOSPITAL | Age: 28
Discharge: HOME | End: 2023-07-31
Attending: EMERGENCY MEDICINE
Payer: MEDICAID

## 2023-07-31 ENCOUNTER — OFFICE VISIT (OUTPATIENT)
Dept: URGENT CARE | Facility: CLINIC | Age: 28
End: 2023-07-31
Payer: MEDICAID

## 2023-07-31 VITALS
SYSTOLIC BLOOD PRESSURE: 129 MMHG | RESPIRATION RATE: 18 BRPM | WEIGHT: 185 LBS | BODY MASS INDEX: 27.32 KG/M2 | TEMPERATURE: 99 F | OXYGEN SATURATION: 97 % | DIASTOLIC BLOOD PRESSURE: 89 MMHG | HEART RATE: 67 BPM

## 2023-07-31 VITALS
HEART RATE: 75 BPM | OXYGEN SATURATION: 100 % | WEIGHT: 160 LBS | HEIGHT: 69 IN | RESPIRATION RATE: 15 BRPM | DIASTOLIC BLOOD PRESSURE: 74 MMHG | TEMPERATURE: 99 F | BODY MASS INDEX: 23.7 KG/M2 | SYSTOLIC BLOOD PRESSURE: 119 MMHG

## 2023-07-31 DIAGNOSIS — L03.113 CELLULITIS OF RIGHT HAND: Primary | ICD-10-CM

## 2023-07-31 DIAGNOSIS — S60.561A INSECT BITE OF RIGHT HAND, INITIAL ENCOUNTER: ICD-10-CM
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
## 2023-07-31 DIAGNOSIS — L03.113 CELLULITIS OF HAND, RIGHT: Primary | ICD-10-CM

## 2023-07-31 LAB
ALBUMIN SERPL BCP-MCNC: 4.3 G/DL (ref 3.5–5.2)
ALP SERPL-CCNC: 84 U/L (ref 55–135)
ALT SERPL W/O P-5'-P-CCNC: 19 U/L (ref 10–44)
ANION GAP SERPL CALC-SCNC: 11 MMOL/L (ref 8–16)
AST SERPL-CCNC: 37 U/L (ref 10–40)
BASOPHILS # BLD AUTO: 0.04 K/UL (ref 0–0.2)
BASOPHILS NFR BLD: 0.3 % (ref 0–1.9)
BILIRUB SERPL-MCNC: 0.6 MG/DL (ref 0.1–1)
BUN SERPL-MCNC: 12 MG/DL (ref 6–20)
CALCIUM SERPL-MCNC: 9.6 MG/DL (ref 8.7–10.5)
CHLORIDE SERPL-SCNC: 108 MMOL/L (ref 95–110)
CO2 SERPL-SCNC: 19 MMOL/L (ref 23–29)
CREAT SERPL-MCNC: 1 MG/DL (ref 0.5–1.4)
DIFFERENTIAL METHOD: ABNORMAL
EOSINOPHIL # BLD AUTO: 0.3 K/UL (ref 0–0.5)
EOSINOPHIL NFR BLD: 2.4 % (ref 0–8)
ERYTHROCYTE [DISTWIDTH] IN BLOOD BY AUTOMATED COUNT: 13 % (ref 11.5–14.5)
EST. GFR  (NO RACE VARIABLE): >60 ML/MIN/1.73 M^2
GLUCOSE SERPL-MCNC: 92 MG/DL (ref 70–110)
HCT VFR BLD AUTO: 48.1 % (ref 40–54)
HGB BLD-MCNC: 16.4 G/DL (ref 14–18)
IMM GRANULOCYTES # BLD AUTO: 0.04 K/UL (ref 0–0.04)
IMM GRANULOCYTES NFR BLD AUTO: 0.3 % (ref 0–0.5)
LYMPHOCYTES # BLD AUTO: 3.1 K/UL (ref 1–4.8)
LYMPHOCYTES NFR BLD: 24.4 % (ref 18–48)
MCH RBC QN AUTO: 30.4 PG (ref 27–31)
MCHC RBC AUTO-ENTMCNC: 34.1 G/DL (ref 32–36)
MCV RBC AUTO: 89 FL (ref 82–98)
MONOCYTES # BLD AUTO: 1.3 K/UL (ref 0.3–1)
MONOCYTES NFR BLD: 10.3 % (ref 4–15)
NEUTROPHILS # BLD AUTO: 7.9 K/UL (ref 1.8–7.7)
NEUTROPHILS NFR BLD: 62.3 % (ref 38–73)
NRBC BLD-RTO: 0 /100 WBC
PLATELET # BLD AUTO: 212 K/UL (ref 150–450)
PMV BLD AUTO: 12.4 FL (ref 9.2–12.9)
POTASSIUM SERPL-SCNC: 5.3 MMOL/L (ref 3.5–5.1)
PROT SERPL-MCNC: 8.4 G/DL (ref 6–8.4)
RBC # BLD AUTO: 5.39 M/UL (ref 4.6–6.2)
SODIUM SERPL-SCNC: 138 MMOL/L (ref 136–145)
WBC # BLD AUTO: 12.64 K/UL (ref 3.9–12.7)

## 2023-07-31 PROCEDURE — 85025 COMPLETE CBC W/AUTO DIFF WBC: CPT | Performed by: EMERGENCY MEDICINE

## 2023-07-31 PROCEDURE — 63600175 PHARM REV CODE 636 W HCPCS: Performed by: EMERGENCY MEDICINE

## 2023-07-31 PROCEDURE — 99499 UNLISTED E&M SERVICE: CPT | Mod: S$GLB,,, | Performed by: FAMILY MEDICINE

## 2023-07-31 PROCEDURE — 99499 NO LOS: ICD-10-PCS | Mod: S$GLB,,, | Performed by: FAMILY MEDICINE

## 2023-07-31 PROCEDURE — 96365 THER/PROPH/DIAG IV INF INIT: CPT

## 2023-07-31 PROCEDURE — 80053 COMPREHEN METABOLIC PANEL: CPT | Performed by: EMERGENCY MEDICINE

## 2023-07-31 PROCEDURE — 99284 EMERGENCY DEPT VISIT MOD MDM: CPT | Mod: 25

## 2023-07-31 RX ORDER — CLINDAMYCIN HYDROCHLORIDE 300 MG/1
300 CAPSULE ORAL EVERY 8 HOURS
Qty: 21 CAPSULE | Refills: 0 | Status: SHIPPED | OUTPATIENT
Start: 2023-07-31 | End: 2023-08-07

## 2023-07-31 RX ORDER — KETOROLAC TROMETHAMINE 10 MG/1
10 TABLET, FILM COATED ORAL EVERY 6 HOURS PRN
Qty: 12 TABLET | Refills: 0 | Status: SHIPPED | OUTPATIENT
Start: 2023-07-31 | End: 2023-08-03

## 2023-07-31 RX ORDER — CEFAZOLIN SODIUM 1 G/50ML
1 SOLUTION INTRAVENOUS ONCE
Status: COMPLETED | OUTPATIENT
Start: 2023-07-31 | End: 2023-07-31

## 2023-07-31 RX ADMIN — CEFAZOLIN SODIUM 1 G: 1 SOLUTION INTRAVENOUS at 09:07

## 2023-07-31 NOTE — ED TRIAGE NOTES
Pt arrived with c/o R hand swelling and itching since yesterday.  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  Pt denies any numbness or tingling.  Reports swelling has spread to wrist today.  Was sent to ER by .  Pt reports PMH of R hand surgery with screws in place.  Pt answering questions appropriately, speaking in complete sentences, respirations even and unlabored.  Aao x 4.

## 2023-07-31 NOTE — PROGRESS NOTES
"Subjective:      Patient ID: Eldon Donald is a 28 y.o. male.    Vitals:  height is 5' 9" (1.753 m) and weight is 72.6 kg (160 lb). His temperature is 98.6 °F (37 °C). His blood pressure is 119/74 and his pulse is 75. His respiration is 15 and oxygen saturation is 100%.     Chief Complaint: Insect Bite    Recent in the woods. States he gets bite from spiders often and may be a spider bite but states this one the edema is swelling. Denies any throat related problems or SOB.     Insect Bite  This is a recurrent problem. The current episode started yesterday. The problem occurs intermittently. The problem has been rapidly worsening. Associated symptoms include joint swelling (right wrist/hand/fingers/going into forearm). Associated symptoms comments: itching. Nothing aggravates the symptoms. Treatments tried: Benadryl. The treatment provided no relief.       Musculoskeletal:  Positive for joint swelling (right wrist/hand/fingers/going into forearm).      Objective:     Physical Exam   Constitutional: He is oriented to person, place, and time.   HENT:   Head: Normocephalic.   Ears:   Right Ear: External ear normal.   Left Ear: External ear normal.   Nose: Nose normal.   Mouth/Throat: Mucous membranes are moist.   Eyes: Conjunctivae are normal.   Cardiovascular: Normal rate.   Pulmonary/Chest: Effort normal.   Musculoskeletal: Normal range of motion.         General: Normal range of motion.   Neurological: He is alert and oriented to person, place, and time.   Psychiatric: His behavior is normal.       Assessment:     1. Cellulitis of hand, right        Plan:       Cellulitis of hand, right        Severe swelling and cellulitis of hand.   Initially lateral, overnight entire dorsum of hand is swollen, and now ascending into the wrist. Erythematous, warm. Sausage fingers.    Difficulty making a fist. --Whole hand blanches when he attempts.   Can still move his fingers.   Not acute sepsis at this time, but he needs higher " level of care.   Concern for compartment syndrome if no intervention given.   May need IV abx.       Pt concerned after discussion. Attempted watch and observe for 24 hours. He preferred to go get evaluated in the ED given the high risk.       Severe swelling and cellulitis of hand.   Initially lateral, overnight entire dorsum of hand is swollen.   Difficulty making a fist.   Whole hand blanches.   Can still move his fingers.   Not acute sepsis at this time, but he needs higher level of care.   Concern for compartment syndrome if no intervention given.   May need IV abx.

## 2023-08-01 NOTE — ED PROVIDER NOTES
"Encounter Date: 7/31/2023       History     Chief Complaint   Patient presents with    Insect Bite     Pt reports he was bit by a spider yesterday and is having swelling to the right hand. Pt has positive pulses.      Patient is a 28-year-old male who presents to the ED with complaint of right hand swelling.  Patient states that he was in [place] cutting grass when he believes he was bit by either a spider or some other insect.  He states initial but over several hours he is developed diffuse swelling redness and tenderness to palpation over the dorsal aspect of his hand radiating up to the dorsal aspect of the mid right forearm. He states that he went to a local urgent care that recommend he come to the ED for further evaluation.  He states that he only took Benadryl for the pain swelling without any significant improvement.  He denies any decreased range of motion or diffuse swelling or subjective feeling of "tightness" to possibly suggest compartment syndrome of the right upper extremity. He denies any known allergies when questioned.       Review of patient's allergies indicates:  No Known Allergies  Past Medical History:   Diagnosis Date    PUD (peptic ulcer disease)      Past Surgical History:   Procedure Laterality Date    polynoidal cyst  09/2017     Family History   Problem Relation Age of Onset    Recurrent abdominal pain Father     Cancer Maternal Grandmother         lung    Diabetes Maternal Grandfather      Social History     Tobacco Use    Smoking status: Former     Current packs/day: 0.00     Types: Cigarettes    Smokeless tobacco: Former     Quit date: 6/15/2016   Substance Use Topics    Alcohol use: Yes    Drug use: Yes     Types: Marijuana     Review of Systems   Constitutional:  Negative for chills and fever.   Respiratory:  Negative for cough and chest tightness.    Gastrointestinal:  Negative for abdominal pain, nausea and vomiting.   Genitourinary:  Negative for flank pain. " "  Musculoskeletal:  Positive for joint swelling. Negative for back pain and neck pain.   Skin:  Positive for color change. Negative for pallor and rash.   Neurological:  Negative for dizziness and headaches.   Psychiatric/Behavioral:  Negative for agitation and confusion.        Physical Exam     Initial Vitals [07/31/23 1817]   BP Pulse Resp Temp SpO2   (!) 147/98 80 18 97.9 °F (36.6 °C) 98 %      MAP       --         Physical Exam    Constitutional: He appears well-developed and well-nourished.   HENT:   Head: Normocephalic and atraumatic.   Right Ear: External ear normal.   Left Ear: External ear normal.   Musculoskeletal:         General: Edema present.      Right hand: Swelling present. No deformity or tenderness. Decreased range of motion.      Comments: There is diffuse swelling to the dorsal aspect of the right hand with overlying erythema suggestive of cellulitis.  The cellulitis is extending over the right wrist joint and to the distal dorsal aspect of the right forearm.  Patient has normal range of motion of the right wrist; no crepitus is appreciated; patient able to make so-called "ok" and "thumbs up" sign without difficulty.      Neurological: He is alert and oriented to person, place, and time. He has normal strength.         ED Course   Procedures  Labs Reviewed   CBC W/ AUTO DIFFERENTIAL - Abnormal; Notable for the following components:       Result Value    Gran # (ANC) 7.9 (*)     Mono # 1.3 (*)     All other components within normal limits   COMPREHENSIVE METABOLIC PANEL - Abnormal; Notable for the following components:    Potassium 5.3 (*)     CO2 19 (*)     All other components within normal limits          Imaging Results              X-Ray Hand 3 view Right (Final result)  Result time 07/31/23 21:29:17      Final result by Julius Gaitan DO (07/31/23 21:29:17)                   Impression:      No acute fracture or dislocation.    Soft tissue edema.      Electronically signed by: Julius " Kandy  Date:    07/31/2023  Time:    21:29               Narrative:    EXAMINATION:  XR HAND COMPLETE 3 VIEW RIGHT    CLINICAL HISTORY:  right hand swelling;    TECHNIQUE:  PA, lateral, and oblique views of the right hand were performed.    COMPARISON:  01/27/2022.    FINDINGS:  There is hardware along the 4th and 5th metacarpals.  Hardware is intact.  Alignment is normal.  There is no acute fracture or dislocation.  There is soft tissue edema of the dorsum of the hand.  No soft tissue gas or radiopaque foreign body.  Joint spaces are preserved.                                       Medications   ceFAZolin (ANCEF) 1 gram in dextrose 5 % 50 mL IVPB (premix) (1 g Intravenous New Bag 7/31/23 2103)     Medical Decision Making:   Initial Assessment:   Presents ED with complaint of right hand swelling after possibly being bit by a spider or some insect; will obtain basic labs, imaging, administer dose of antibiotics   Differential Diagnosis:   Cellulitis with abscess, cellulitis without abscess, MSK strain, insect bite, spider bite, dog bite, snake bite   Clinical Tests:   Lab Tests: Ordered and Reviewed  Radiological Study: Ordered and Reviewed  ED Management:  - largely unremarkable; no significant leukocytosis; patient's physical exam concerning for possible developing cellulitis to the dorsal aspect of the right hand; very low suspicion for compartment syndrome the patient has no pain on passive stretch or other classic findings of compartment syndrome; very low suspicion for necrotizing type infection; the patient is afebrile well-appearing nontoxic in appearance; patient was administered a dose of Ancef in the ED and will be clindamycin and Toradol.  He was given very strict return as well as instructions return to the ED or his PCP  in 48 hours for a recheck of today's complaints.  Patient verbalized understanding and expressed willingness to comply my recommendations with the plan for discharge as indicated above  as well as plan for outpatient treatment.  I do not feel that the patient merits admission at this time as his workup is largely unremarkable and he is not immunosuppressed.  - No further intervention is indicated at this time after having taken into account the patient's history, physical exam findings, and empirical and objective data obtained during the patient's emergency department workup.   - The patient is at low risk for an emergent medical condition at this time, and I am of the belief that that it is safe to discharge the patient from the emergency department.   - The patient is instructed to follow up as outpatient as indicated on the discharge paperwork.    - I have discussed the specifics of the workup with the patient and the patient has verbalized understanding of the details of the workup, the diagnosis, the treatment plan, and the need for outpatient follow-up.    - Although the patient has no emergent etiology today this does not preclude the development of an emergent condition so, in addition, I have advised the patient that they can return to the ED and/or activate EMS at any time with worsening of their symptoms, change of their symptoms, or with any other medical complaint.    - The patient remained comfortable and stable during their visit in the ED.    - Discharge and follow-up instructions discussed with the patient who expressed understanding and willingness to comply with my recommendations.  - Results of all emergency department tests  discussed thoroughly with patient; all patient questions answered; pt in agreement with plan  - Pt instructed to follow up with PCP in 2-3 days for recheck of today's complaints  - Pt given strict emergency department return precautions for any new or worsening of symptoms  - Pt discharged from the emergency department in stable condition, in no acute distress                 ED Course as of 07/31/23 2153 Mon Jul 31, 2023 2045 WBC: 12.64  Reviewed by  self- WNL  []   2134 Potassium(!): 5.3 [LC]   2136 X-Ray Hand 3 view Right  No acute abnormality per my independent interpretation; no gas; osseous structures are unremarkable  [LC]   2147 On re-evaluation, patient reports swelling edema.  Reports no worsening red streaking or erythema.  His laboratory analysis is unremarkable.  He is afebrile nontoxic.  Very low suspicion for septic joint or compartment syndrome.  Will discharge home with prescription for clindamycin with patient instructed to return to the ED or his PCP in 48 hours for a recheck of today's complaints.  [LC]      ED Course User Index  [] Chet Baker MD                   Clinical Impression:   Final diagnoses:  [L03.113] Cellulitis of right hand (Primary)  [S60.561A, W57.XXXA] Insect bite of right hand, initial encounter        ED Disposition Condition    Discharge Stable          ED Prescriptions       Medication Sig Dispense Start Date End Date Auth. Provider    clindamycin (CLEOCIN) 300 MG capsule Take 1 capsule (300 mg total) by mouth every 8 (eight) hours. for 7 days 21 capsule 7/31/2023 8/7/2023 Chet Baker MD    ketorolac (TORADOL) 10 mg tablet Take 1 tablet (10 mg total) by mouth every 6 (six) hours as needed for Pain. 12 tablet 7/31/2023 8/3/2023 Chet Baker MD          Follow-up Information       Follow up With Specialties Details Why Contact Info    [facility] - Emergency Dept Emergency Medicine In 2 days For wound re-check [address] [facility] [id number] [phone]    [facility] Emergency Dept Emergency Medicine  If symptoms worsen [address] [facility] [id number] [phone]             Chet Baker MD  07/31/23 3554

## 2023-08-01 NOTE — DISCHARGE INSTRUCTIONS
Please take medications as prescribed.  Keep extremity elevated help reduce edema.  Return immediately to the ED for any new or worsening symptoms as discussed prior to discharge.

## 2023-08-01 NOTE — ED NOTES
Patient presents with c/o R hand pain with redness, swelling since last night. Patient states he thinks he has been bit by a spider. Denies numbness, tingling, + 2 radial pulses

## 2024-03-08 ENCOUNTER — PATIENT MESSAGE (OUTPATIENT)
Dept: OBSTETRICS AND GYNECOLOGY | Facility: CLINIC | Age: 29
End: 2024-03-08
Payer: MEDICAID

## 2024-10-18 ENCOUNTER — OFFICE VISIT (OUTPATIENT)
Dept: FAMILY MEDICINE | Facility: CLINIC | Age: 29
End: 2024-10-18
Payer: COMMERCIAL

## 2024-10-18 ENCOUNTER — PATIENT MESSAGE (OUTPATIENT)
Dept: HEMATOLOGY/ONCOLOGY | Facility: CLINIC | Age: 29
End: 2024-10-18
Payer: COMMERCIAL

## 2024-10-18 ENCOUNTER — LAB VISIT (OUTPATIENT)
Dept: LAB | Facility: HOSPITAL | Age: 29
End: 2024-10-18
Attending: STUDENT IN AN ORGANIZED HEALTH CARE EDUCATION/TRAINING PROGRAM
Payer: COMMERCIAL

## 2024-10-18 ENCOUNTER — OFFICE VISIT (OUTPATIENT)
Dept: SURGERY | Facility: CLINIC | Age: 29
End: 2024-10-18
Payer: COMMERCIAL

## 2024-10-18 VITALS
SYSTOLIC BLOOD PRESSURE: 106 MMHG | OXYGEN SATURATION: 96 % | TEMPERATURE: 98 F | BODY MASS INDEX: 27.77 KG/M2 | HEART RATE: 68 BPM | DIASTOLIC BLOOD PRESSURE: 68 MMHG | WEIGHT: 187.5 LBS | HEIGHT: 69 IN

## 2024-10-18 VITALS
SYSTOLIC BLOOD PRESSURE: 116 MMHG | WEIGHT: 188.25 LBS | DIASTOLIC BLOOD PRESSURE: 76 MMHG | BODY MASS INDEX: 27.88 KG/M2 | OXYGEN SATURATION: 99 % | HEART RATE: 73 BPM | HEIGHT: 69 IN

## 2024-10-18 DIAGNOSIS — K21.9 GASTROESOPHAGEAL REFLUX DISEASE, UNSPECIFIED WHETHER ESOPHAGITIS PRESENT: ICD-10-CM

## 2024-10-18 DIAGNOSIS — L05.91 PILONIDAL CYST: ICD-10-CM

## 2024-10-18 DIAGNOSIS — Z00.00 ANNUAL PHYSICAL EXAM: ICD-10-CM

## 2024-10-18 DIAGNOSIS — Z00.00 ANNUAL PHYSICAL EXAM: Primary | ICD-10-CM

## 2024-10-18 DIAGNOSIS — Z80.9 FAMILY HISTORY OF CANCER: ICD-10-CM

## 2024-10-18 LAB
ALBUMIN SERPL BCP-MCNC: 4.1 G/DL (ref 3.5–5.2)
ALP SERPL-CCNC: 75 U/L (ref 40–150)
ALT SERPL W/O P-5'-P-CCNC: 25 U/L (ref 10–44)
ANION GAP SERPL CALC-SCNC: 12 MMOL/L (ref 8–16)
AST SERPL-CCNC: 24 U/L (ref 10–40)
BASOPHILS # BLD AUTO: 0.06 K/UL (ref 0–0.2)
BASOPHILS NFR BLD: 0.7 % (ref 0–1.9)
BILIRUB SERPL-MCNC: 0.5 MG/DL (ref 0.1–1)
BUN SERPL-MCNC: 15 MG/DL (ref 6–20)
CALCIUM SERPL-MCNC: 9.7 MG/DL (ref 8.7–10.5)
CHLORIDE SERPL-SCNC: 108 MMOL/L (ref 95–110)
CHOLEST SERPL-MCNC: 205 MG/DL (ref 120–199)
CHOLEST/HDLC SERPL: 4.7 {RATIO} (ref 2–5)
CO2 SERPL-SCNC: 18 MMOL/L (ref 23–29)
CREAT SERPL-MCNC: 0.9 MG/DL (ref 0.5–1.4)
DIFFERENTIAL METHOD BLD: ABNORMAL
EOSINOPHIL # BLD AUTO: 0.3 K/UL (ref 0–0.5)
EOSINOPHIL NFR BLD: 3.5 % (ref 0–8)
ERYTHROCYTE [DISTWIDTH] IN BLOOD BY AUTOMATED COUNT: 12.8 % (ref 11.5–14.5)
EST. GFR  (NO RACE VARIABLE): >60 ML/MIN/1.73 M^2
ESTIMATED AVG GLUCOSE: 103 MG/DL (ref 68–131)
GLUCOSE SERPL-MCNC: 96 MG/DL (ref 70–110)
HBA1C MFR BLD: 5.2 % (ref 4–5.6)
HCT VFR BLD AUTO: 48.1 % (ref 40–54)
HCV AB SERPL QL IA: NORMAL
HDLC SERPL-MCNC: 44 MG/DL (ref 40–75)
HDLC SERPL: 21.5 % (ref 20–50)
HGB BLD-MCNC: 15.6 G/DL (ref 14–18)
HIV 1+2 AB+HIV1 P24 AG SERPL QL IA: NORMAL
IMM GRANULOCYTES # BLD AUTO: 0.06 K/UL (ref 0–0.04)
IMM GRANULOCYTES NFR BLD AUTO: 0.7 % (ref 0–0.5)
LDLC SERPL CALC-MCNC: 144 MG/DL (ref 63–159)
LYMPHOCYTES # BLD AUTO: 2.3 K/UL (ref 1–4.8)
LYMPHOCYTES NFR BLD: 28.7 % (ref 18–48)
MCH RBC QN AUTO: 30.6 PG (ref 27–31)
MCHC RBC AUTO-ENTMCNC: 32.4 G/DL (ref 32–36)
MCV RBC AUTO: 95 FL (ref 82–98)
MONOCYTES # BLD AUTO: 0.7 K/UL (ref 0.3–1)
MONOCYTES NFR BLD: 8.9 % (ref 4–15)
NEUTROPHILS # BLD AUTO: 4.6 K/UL (ref 1.8–7.7)
NEUTROPHILS NFR BLD: 57.5 % (ref 38–73)
NONHDLC SERPL-MCNC: 161 MG/DL
NRBC BLD-RTO: 0 /100 WBC
PLATELET # BLD AUTO: 314 K/UL (ref 150–450)
PMV BLD AUTO: 11.9 FL (ref 9.2–12.9)
POTASSIUM SERPL-SCNC: 4.7 MMOL/L (ref 3.5–5.1)
PROT SERPL-MCNC: 7.3 G/DL (ref 6–8.4)
RBC # BLD AUTO: 5.09 M/UL (ref 4.6–6.2)
SODIUM SERPL-SCNC: 138 MMOL/L (ref 136–145)
TRIGL SERPL-MCNC: 85 MG/DL (ref 30–150)
TSH SERPL DL<=0.005 MIU/L-ACNC: 1.31 UIU/ML (ref 0.4–4)
WBC # BLD AUTO: 8.08 K/UL (ref 3.9–12.7)

## 2024-10-18 PROCEDURE — 3078F DIAST BP <80 MM HG: CPT | Mod: CPTII,S$GLB,, | Performed by: STUDENT IN AN ORGANIZED HEALTH CARE EDUCATION/TRAINING PROGRAM

## 2024-10-18 PROCEDURE — 3044F HG A1C LEVEL LT 7.0%: CPT | Mod: CPTII,S$GLB,, | Performed by: STUDENT IN AN ORGANIZED HEALTH CARE EDUCATION/TRAINING PROGRAM

## 2024-10-18 PROCEDURE — 3074F SYST BP LT 130 MM HG: CPT | Mod: CPTII,S$GLB,, | Performed by: STUDENT IN AN ORGANIZED HEALTH CARE EDUCATION/TRAINING PROGRAM

## 2024-10-18 PROCEDURE — 99999 PR PBB SHADOW E&M-EST. PATIENT-LVL IV: CPT | Mod: PBBFAC,,, | Performed by: STUDENT IN AN ORGANIZED HEALTH CARE EDUCATION/TRAINING PROGRAM

## 2024-10-18 PROCEDURE — 1159F MED LIST DOCD IN RCRD: CPT | Mod: CPTII,S$GLB,, | Performed by: STUDENT IN AN ORGANIZED HEALTH CARE EDUCATION/TRAINING PROGRAM

## 2024-10-18 PROCEDURE — 80053 COMPREHEN METABOLIC PANEL: CPT | Performed by: STUDENT IN AN ORGANIZED HEALTH CARE EDUCATION/TRAINING PROGRAM

## 2024-10-18 PROCEDURE — 99204 OFFICE O/P NEW MOD 45 MIN: CPT | Mod: S$GLB,,, | Performed by: STUDENT IN AN ORGANIZED HEALTH CARE EDUCATION/TRAINING PROGRAM

## 2024-10-18 PROCEDURE — 84443 ASSAY THYROID STIM HORMONE: CPT | Performed by: STUDENT IN AN ORGANIZED HEALTH CARE EDUCATION/TRAINING PROGRAM

## 2024-10-18 PROCEDURE — 83036 HEMOGLOBIN GLYCOSYLATED A1C: CPT | Performed by: STUDENT IN AN ORGANIZED HEALTH CARE EDUCATION/TRAINING PROGRAM

## 2024-10-18 PROCEDURE — 3008F BODY MASS INDEX DOCD: CPT | Mod: CPTII,S$GLB,, | Performed by: STUDENT IN AN ORGANIZED HEALTH CARE EDUCATION/TRAINING PROGRAM

## 2024-10-18 PROCEDURE — 86803 HEPATITIS C AB TEST: CPT | Performed by: STUDENT IN AN ORGANIZED HEALTH CARE EDUCATION/TRAINING PROGRAM

## 2024-10-18 PROCEDURE — 80061 LIPID PANEL: CPT | Performed by: STUDENT IN AN ORGANIZED HEALTH CARE EDUCATION/TRAINING PROGRAM

## 2024-10-18 PROCEDURE — 36415 COLL VENOUS BLD VENIPUNCTURE: CPT | Mod: PO | Performed by: STUDENT IN AN ORGANIZED HEALTH CARE EDUCATION/TRAINING PROGRAM

## 2024-10-18 PROCEDURE — 85025 COMPLETE CBC W/AUTO DIFF WBC: CPT | Performed by: STUDENT IN AN ORGANIZED HEALTH CARE EDUCATION/TRAINING PROGRAM

## 2024-10-18 PROCEDURE — 87389 HIV-1 AG W/HIV-1&-2 AB AG IA: CPT | Performed by: STUDENT IN AN ORGANIZED HEALTH CARE EDUCATION/TRAINING PROGRAM

## 2024-10-18 NOTE — PROGRESS NOTES
FmHx father with heart cancer (cardiovascular angiosarcoma), MGP and MGM with cancer not sure what types.     Hx PUD, will intermittently have reflux. But fairly rare.     Had a pilonidal cyst in the past. Had to get this drained in the past. Will flare up frequently. Soaking in epsom salt helps. Seems to always be open but does not always drain. When it does drain is mix of pus and blood. Struggles to keep the area clean. About once a month will flare and cause discomfort with sitting.

## 2024-10-18 NOTE — PROGRESS NOTES
"  History & Physical  [facility]      SUBJECTIVE:     History of Present Illness:  Patient is a 29 y.o. male presents to clinic to establish care. Current diagnoses include peptic ulcer disease, GERD.     FmHx father with heart cancer (cardiovascular angiosarcoma), MGP and MGM with cancer not sure what types.     Hx PUD, will intermittently have reflux. But fairly rare.     Had a pilonidal cyst in the past. Had to get this drained in the past. Will flare up frequently. Soaking in epsom salt helps. Seems to always be open but does not always drain. When it does drain is mix of pus and blood. Struggles to keep the area clean. About once a month will flare and cause discomfort with sitting.     Immunizations UTD    Smoker- never  Drugs- marijuana use     Review of patient's allergies indicates:  No Known Allergies    Past Medical History:   Diagnosis Date    PUD (peptic ulcer disease)      Past Surgical History:   Procedure Laterality Date    polynoidal cyst  09/2017     Family History   Problem Relation Name Age of Onset    Recurrent abdominal pain Father      Cancer Father      Cancer Maternal Grandmother          lung    Diabetes Maternal Grandfather       Social History     Tobacco Use    Smoking status: Former     Types: Cigarettes    Smokeless tobacco: Former     Quit date: 6/15/2016   Substance Use Topics    Alcohol use: Yes    Drug use: Yes     Types: Marijuana        OBJECTIVE:     Vital Signs (Most Recent)  Vitals:    10/18/24 1311   BP: 116/76   BP Location: Right arm   Patient Position: Sitting   Pulse: 73   SpO2: 99%   Weight: 85.4 kg (188 lb 4.4 oz)   Height: 5' 9" (1.753 m)     BMI: 27.80    Physical Exam:  Gen: Well nourished and developed, NAD, appears stated age  HEENT: normocephalic, atraumatic, EOMI, TMs visualized bilaterally not impacted by cerumen, no nasal septal deviation, oropharynx mucosa pink and moist without tonsillar exudates  Neck: trachea midline, no " LAD, no thyromegaly  CV: RRR, S1 and S2 present, no murmurs, no LE edema, radial and dorsalis pedis pulses equal and present bilaterally  Resp: breathing comfortably on room air, CTAB, no wheezes or crackles  Abd: Soft, non-distended, non-tender  Skin: No rashes or abnormal skin lesions, no apparent jaundice or bruising  Neuro: A&Ox4, sensation intact throughout, spontaneous movements, gait smooth jean carlos wnl, UE and LE strength 5/5 bilaterally  MSK: Full ROM of all extremities, no tenderness to palpation of shoulders and knees, no joint edema  Psych: Logical thought process, answers questions appropriately      ASSESSMENT/PLAN:   29 y.o.male presents to clinic to establish care/annual exam    1. Annual physical exam (Primary)  UTD on HM.   - Hemoglobin A1C; Future  - TSH; Future  - Comprehensive Metabolic Panel; Future  - CBC Auto Differential; Future  - Lipid Panel; Future  - HIV 1/2 Ag/Ab (4th Gen); Future  - HEPATITIS C ANTIBODY; Future    2. Gastroesophageal reflux disease, unspecified whether esophagitis present  Doing well off of treatment.     3. Pilonidal cyst  Exam deferred since had diagnosis from general surgeon and will refer to surgery for further management. Not currently in a flare. Pt verbalized understanding and was in agreement with the plan.    - Ambulatory referral/consult to General Surgery; Future    4. Family history of cancer  Given significant close FmHx would benefit from genetics screen. Would like to get this done.   - Ambulatory referral/consult to Genetics; Future     Follow-up: 1 year for annual    Alex Ruiz DO  Family Medicine

## 2024-10-20 NOTE — PROGRESS NOTES
[facility] History & Physical         Name: Eldon Donald   : 1995   MRN: [id number]      History of Present Illness       Chief Compliant:  Pilonidal disease    HPI:  Eldon Donald is a 29 y.o. male who presents for evaluation of pilonidal disease.  Says he has recurrent episodes of swelling and drainage in the gluteal crease.  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  He has had 1 incision and drainage procedure in the past.  He says he manages his symptoms with Epsom salt baths which seemed to help the occasional swelling.      Past Medical History:   Diagnosis Date    PUD (peptic ulcer disease)      Past Surgical History:   Procedure Laterality Date    polynoidal cyst  2017     Family History   Problem Relation Name Age of Onset    Recurrent abdominal pain Father      Cancer Father      Cancer Maternal Grandmother          lung    Diabetes Maternal Grandfather       Patient has No Known Allergies.  Social Drivers of Health     Tobacco Use: Medium Risk (10/18/2024)    Patient History     Smoking Tobacco Use: Former     Smokeless Tobacco Use: Former     Passive Exposure: Not on file   Alcohol Use: Not on file   Financial Resource Strain: Not on file   Food Insecurity: Not on file   Transportation Needs: Not on file   Physical Activity: Not on file   Stress: Not on file   Housing Stability: Not on file   Depression: Low Risk  (10/18/2024)    Depression     Last PHQ-4: Flowsheet Data: 0   Utilities: Not on file   Health Literacy: Not on file   Social Isolation: Not on file        Home Medications:   Prior to Admission medications    Not on File       Review of Systems: Complete review of systems elicited and pertinent information is in the HPI    Physical Exam:     Vitals:  Vitals:    10/18/24 1621   BP: 106/68   Pulse: 68   Temp: 97.5 °F (36.4 °C)   TempSrc: Oral   SpO2: 96%   Weight: 85.1 kg  "(187 lb 8 oz)   Height: 5' 9" (1.753 m)           General Appearance:  Alert, cooperative, no distress, appears stated age   Head:  Normocephalic, without obvious abnormality, atraumatic   Eyes:  No scleral icterus   Nose: Nares normal, septum midline   Throat: Lips, mucosa, and tongue normal   Neck: Supple, symmetrical, trachea midline, speaks with normal voice   Lungs:   respirations unlabored   Heart:  Regular rate    Abdomen:   Soft, non-tender, non-distended   Genitalia:  Deferred   Rectal:  In the gluteal crease there are multiple pits as well as small area of induration without fluctuance or erythema   Extremities: Extremities normal, atraumatic, no cyanosis or edema   Pulses: 2+ and symmetric   Skin: No jaundice   Neurologic: No focal deficits        Results Reviewed:     Labs and imaging reviewed and interpreted by me, as refected in HPI and A&P.      Assessment and plan:     This is a 29-year-old man with pilonidal disease and recurrent abscess formation that is causing significant discomfort and disruption in his daily life.  He tells me that he desires surgery.  We had a long discussion about surgery for pilonidal disease.  I explained that it is a long course of recovery after definitive pilonidal surgery.  It often requires wound care that can last for weeks or months.  The advantage of surgery is that undergoing all that wound care and healing gives a good chance of cure of the disease.  The ideal time for doing surgery is when the disease is at a quiescent point.  He is going to think about the timing with his job and contact me when he wants to move forward with surgery.    History, physical exam, laboratory, and radiographic findings were reviewed.        Marshall Loza MD, FACS  General and Endocrine Surgery         12:45 PM, 10/20/2024      "

## 2024-10-21 ENCOUNTER — PATIENT MESSAGE (OUTPATIENT)
Dept: FAMILY MEDICINE | Facility: CLINIC | Age: 29
End: 2024-10-21
Payer: COMMERCIAL

## 2024-10-21 DIAGNOSIS — E78.49 OTHER HYPERLIPIDEMIA: Primary | ICD-10-CM

## 2024-10-25 ENCOUNTER — OFFICE VISIT (OUTPATIENT)
Dept: URGENT CARE | Facility: CLINIC | Age: 29
End: 2024-10-25
Payer: COMMERCIAL

## 2024-10-25 VITALS
HEIGHT: 69 IN | WEIGHT: 187.38 LBS | HEART RATE: 77 BPM | SYSTOLIC BLOOD PRESSURE: 122 MMHG | BODY MASS INDEX: 27.75 KG/M2 | RESPIRATION RATE: 20 BRPM | TEMPERATURE: 99 F | OXYGEN SATURATION: 98 % | DIASTOLIC BLOOD PRESSURE: 79 MMHG

## 2024-10-25 DIAGNOSIS — J06.9 VIRAL UPPER RESPIRATORY TRACT INFECTION WITH COUGH: Primary | ICD-10-CM

## 2024-10-25 LAB
CTP QC/QA: YES
SARS-COV-2 AG RESP QL IA.RAPID: NEGATIVE

## 2024-10-25 RX ORDER — BENZONATATE 100 MG/1
100 CAPSULE ORAL 3 TIMES DAILY PRN
Qty: 30 CAPSULE | Refills: 0 | Status: SHIPPED | OUTPATIENT
Start: 2024-10-25 | End: 2024-11-04

## 2024-10-25 RX ORDER — AZELASTINE 1 MG/ML
1 SPRAY, METERED NASAL 2 TIMES DAILY PRN
Qty: 30 ML | Refills: 0 | Status: SHIPPED | OUTPATIENT
Start: 2024-10-25

## 2024-10-25 RX ORDER — LORATADINE 10 MG/1
10 TABLET ORAL DAILY
Qty: 30 TABLET | Refills: 0 | Status: SHIPPED | OUTPATIENT
Start: 2024-10-25

## 2024-10-25 NOTE — PROGRESS NOTES
"Subjective:      Patient ID: Eldon Donald is a 29 y.o. male.    Vitals:  height is 5' 9" (1.753 m) and weight is 85 kg (187 lb 6.3 oz). His oral temperature is 98.5 °F (36.9 °C). His blood pressure is 122/79 and his pulse is 77. His respiration is 20 and oxygen saturation is 98%.     Chief Complaint: Sinus Problem    Pt present with congestion, sneezing, cough, headaches,  Sx started 2 days ago. Tx include allegra with no relief.   Provider note below:  This is a 29 y.o. male who presents today with a chief complaint of sinus congestion, sneezing, cough and headache started 2 days ago, denies fever, body aches or chills, denies  wheezing or shortness of breath, denies nausea, vomiting, diarrhea or abdominal pain, denies chest pain or dizziness positional lightheadedness, denies sore throat or trouble swallowing, denies loss of taste or smell, or any other symptoms       Sinus Problem  This is a new problem. The problem has been gradually worsening since onset. There has been no fever. His pain is at a severity of 0/10. He is experiencing no pain. Associated symptoms include congestion, coughing, sinus pressure and sneezing. Past treatments include nothing.     HENT:  Positive for congestion and sinus pressure.    Respiratory:  Positive for cough.    Allergic/Immunologic: Positive for sneezing.      Objective:     Physical Exam   Constitutional: He is oriented to person, place, and time. He appears well-developed. He is cooperative.  Non-toxic appearance. He does not appear ill. No distress.   HENT:   Head: Normocephalic and atraumatic.   Ears:   Right Ear: Hearing, tympanic membrane, external ear and ear canal normal.   Left Ear: Hearing, tympanic membrane, external ear and ear canal normal.   Nose: Nose normal. No mucosal edema, rhinorrhea or nasal deformity. No epistaxis. Right sinus exhibits no maxillary sinus tenderness and no frontal sinus tenderness. Left sinus exhibits no maxillary sinus tenderness and no " frontal sinus tenderness.   Mouth/Throat: Uvula is midline, oropharynx is clear and moist and mucous membranes are normal. No trismus in the jaw. Normal dentition. No uvula swelling. No oropharyngeal exudate, posterior oropharyngeal edema, posterior oropharyngeal erythema, tonsillar abscesses or cobblestoning.   Eyes: Conjunctivae and lids are normal. No scleral icterus. Extraocular movement intact vision grossly intact gaze aligned appropriately   Neck: Trachea normal and phonation normal. Neck supple. No edema present. No erythema present. No neck rigidity present.   Cardiovascular: Normal rate, regular rhythm, normal heart sounds and normal pulses.   Pulmonary/Chest: Effort normal and breath sounds normal. No respiratory distress. He has no decreased breath sounds. He has no rhonchi.   Abdominal: Normal appearance.   Musculoskeletal: Normal range of motion.         General: No deformity. Normal range of motion.   Neurological: He is alert and oriented to person, place, and time. He exhibits normal muscle tone. Coordination normal.   Skin: Skin is warm, dry, intact, not diaphoretic and not pale.   Psychiatric: His speech is normal and behavior is normal. Judgment and thought content normal.   Nursing note and vitals reviewed.    Results for orders placed or performed in visit on 10/25/24   SARS Coronavirus 2 Antigen, POCT Manual Read    Collection Time: 10/25/24  1:46 PM   Result Value Ref Range    SARS Coronavirus 2 Antigen Negative Negative     Acceptable Yes          Patient in no acute distress.  Vitals reassuring.  Discussed results/diagnosis/plan in depth with patient in clinic. Strict precautions given to patient to monitor for worsening signs and symptoms. Advised to follow up with primary.All questions answered. Strict ER precautions given. If your symptoms worsens or fail to improve you should go to the Emergency Room. Discharge and follow-up instructions given verbally/printed. Discharge  and follow-up instructions discussed with the patient who expressed understanding and willingness to comply with my recommendations.Patient voiced understanding and in agreement with current treatment plan.     Please be advised this text was dictated with [employer] software and may contain errors due to translation.     Assessment:     1. Viral upper respiratory tract infection with cough        Plan:       Viral upper respiratory tract infection with cough  -     SARS Coronavirus 2 Antigen, POCT Manual Read    Other orders  -     benzonatate (TESSALON) 100 MG capsule; Take 1 capsule (100 mg total) by mouth 3 (three) times daily as needed for Cough.  Dispense: 30 capsule; Refill: 0  -     azelastine (ASTELIN) 137 mcg (0.1 %) nasal spray; 1 spray (137 mcg total) by Nasal route 2 (two) times daily as needed for Rhinitis.  Dispense: 30 mL; Refill: 0  -     loratadine (CLARITIN) 10 mg tablet; Take 1 tablet (10 mg total) by mouth once daily.  Dispense: 30 tablet; Refill: 0                  Patient Instructions   PLEASE READ YOUR DISCHARGE INSTRUCTIONS ENTIRELY AS IT CONTAINS IMPORTANT INFORMATION.      Please drink plenty of fluids.    Please get plenty of rest.    Please return here or go to the Emergency Department for any concerns or worsening of condition.    Please take an over the counter antihistamine medication (allegra/Claritin/Zyrtec) of your choice as directed.    Try an over the counter decongestant like Mucinex D or Sudafed. You buy this behind the pharmacy counter    If you do have Hypertension or palpitations, it is safe to take Coricidin HBP for relief of sinus symptoms.    If not allergic, please take over the counter Tylenol (Acetaminophen) and/or Motrin (Ibuprofen) as directed for control of pain and/or fever.  Please follow up with your primary care doctor or specialist as needed.    Sore throat recommendations: Warm fluids, warm salt water gargles, throat lozenges, tea, honey, soup, rest,  hydration.    Use over the counter flonase: one spray each nostril twice daily OR two sprays each nostril once daily.     If you  smoke, please stop smoking.      Please return or see your primary care doctor if you develop new or worsening symptoms.     Please arrange follow up with your primary medical clinic as soon as possible. You must understand that you've received an Urgent Care treatment only and that you may be released before all of your medical problems are known or treated. You, the patient, will arrange for follow up as instructed. If your symptoms worsen or fail to improve you should go to the Emergency Room.

## 2024-11-14 ENCOUNTER — TELEPHONE (OUTPATIENT)
Dept: HEMATOLOGY/ONCOLOGY | Facility: CLINIC | Age: 29
End: 2024-11-14
Payer: COMMERCIAL

## 2024-11-19 ENCOUNTER — TELEPHONE (OUTPATIENT)
Dept: HEMATOLOGY/ONCOLOGY | Facility: CLINIC | Age: 29
End: 2024-11-19
Payer: COMMERCIAL

## 2024-11-19 NOTE — TELEPHONE ENCOUNTER
Called and spoke to patient to confirm the appointment on 11/21/24 and to complete the genetic questionnaire

## 2024-11-21 ENCOUNTER — TELEPHONE (OUTPATIENT)
Dept: HEMATOLOGY/ONCOLOGY | Facility: CLINIC | Age: 29
End: 2024-11-21
Payer: COMMERCIAL

## 2024-11-21 NOTE — TELEPHONE ENCOUNTER
Called and spoke to patient to see if he was on his way for his 11 am genetic appointment he was in a meeting and had to reschedule.

## 2024-12-27 ENCOUNTER — PATIENT MESSAGE (OUTPATIENT)
Dept: HEMATOLOGY/ONCOLOGY | Facility: CLINIC | Age: 29
End: 2024-12-27
Payer: COMMERCIAL

## 2025-01-02 ENCOUNTER — TELEPHONE (OUTPATIENT)
Dept: HEMATOLOGY/ONCOLOGY | Facility: CLINIC | Age: 30
End: 2025-01-02
Payer: COMMERCIAL

## 2025-01-02 NOTE — TELEPHONE ENCOUNTER
Called and spoke to patient to confirm the in office appointment on 1/6/25 and to complete the genetic questionnaire.

## 2025-01-06 ENCOUNTER — OFFICE VISIT (OUTPATIENT)
Dept: HEMATOLOGY/ONCOLOGY | Facility: CLINIC | Age: 30
End: 2025-01-06
Payer: COMMERCIAL

## 2025-01-06 DIAGNOSIS — Z80.9 FAMILY HISTORY OF CANCER: ICD-10-CM

## 2025-01-06 DIAGNOSIS — Z71.83 ENCOUNTER FOR NONPROCREATIVE GENETIC COUNSELING: Primary | ICD-10-CM

## 2025-01-06 PROCEDURE — 99999 PR PBB SHADOW E&M-EST. PATIENT-LVL II: CPT | Mod: PBBFAC,,,

## 2025-01-06 NOTE — PROGRESS NOTES
"Cancer Genetics  Hereditary and High-Risk Clinic  Department of Hematology and Oncology  [facility]    [facility]    Date of Service:  25  Visit Provider:  Eros Coppola, [facility] [place] [facility] [place]  Collaborating Physician:  Pancho Torres MD    Patient ID  Name: Eldon Donald    : 1995    MRN: [id number]      Referring Provider:   Alex Ruiz DO  [address] [facility]  [place] [address]    Televisit Information  The patient location is:  [facility].    The chief complaint leading to consultation is:  As below.    Visit type: in-person  Face-to-face time with patient:  Approximately 20 minutes.    Approximately 50 minutes in total were spent on this encounter, which includes face-to-face time and non-face-to-face time preparing to see the patient (e.g., review of tests), obtaining and/or reviewing separately obtained history, documenting clinical information in the electronic or other health record, independently interpreting results (not separately reported) and communicating results to the patient/family/caregiver, or coordinating care (not separately reported).  .    ESTRELLITA Donald is a 29 y.o. yo male who was referred to genetic counseling due to his family history of cardiovascular angiosarcoma in a first degree relative. After a discussion of POT1 and its link to angiosarcoma, as well as other genes linked to hereditary cancer, Eldon has decided to defer testing at this time. A portal message will be sent for coordination of testing in case he wishes to proceed with testing in the future.    SUBJECTIVE      Chief Complaint: Genetic evaluation (family history of cancer)    History of Present Illness (HPI):  Eldon Donald ("Eldon"), 29 y.o., assigned male sex at birth, is new to the [facility] and to me.  He was referred by Dr. Alex Ruiz (Family Medicine) for hereditary cancer risk assessment given his family history " "of cancer.    Age:  29 y.o.   Race and ethnicity:  White, Not  or /a  Weight:  187 lb  Height:  5'9"  Previous germline cancer genetic testing:  No    Cancer History  No personal history of cancer   No personal history of masses/tumors/lesions  Polynoidal cyst     GI History  Upper GI screenin16 - normal  Most recent colonoscopy: never  Colon polyp:  N/A  Pancreatitis:  No    Prostate History  No issues reported  PSA: N/A  ASHOK: N/A    Dermatologic History  No issues reported  Abnormal moles/lesions: no, used to have warts that needed to be scraped off, described as "like chickenpox"  Skin cancer screening: no    Focused Social History  Tobacco Use: Medium Risk (10/25/2024)    Patient History     Smoking Tobacco Use: Former     Smokeless Tobacco Use: Former     Passive Exposure: Not on file      Review of Systems   Patient's Distress Score today was 2/10 (with 10 being the worst).  Patient attributes this to general life stress. Patient denies experiencing suicidal or homicidal ideations (SI/HI).  Offered patient a referral to mental health services and declines.     FAMILY HISTORY      ONCOLOGY PEDIGREE  Ashkenazi Faith:  No  Consanguinity:  No  Hereditary cancer genetic testing in blood relatives:  No    Family Cancer Pedigree:  Pedigree Image    Eldon reported his  family history of cancer as follows:   Father ( at 55y) diagnosed with cardiovascular angiosarcoma at 52y  Paternal uncle possibly diagnosed with pancreatic cancer, unknown   Maternal grandfather () diagnosed with skin cancer, unknown type, sun exposure  Maternal grandmother () diagnosed with lung cancer, former smoker      COUNSELING      Pre-test cancer genetic counseling was conducted.        Causes of Cancer:  Cancer occurs when cells grow out of control. Some genes help protect against cancer by controlling the growth of cells. However, mutations (problems) in these genes can prevent them from working " properly, increasing the risk of developing cancer.  Sporadic Cancer: Most people who get cancer have sporadic cancer. Sporadic cancer is caused by mutations that occur during the lifetime. These mutations may be caused by aging, environmental exposures (ex. UV radiation, carcinogens), lifestyle (ex. smoking, drinking alcohol, sunbathing, poor diet), other medical conditions (ex. hepatitis, HPV, ulcerative colitis), or other factors.   Hereditary Cancer: A small percentage (5-10%) of people who develop cancer were born with a mutation already in one of the cancer protection genes.  Familial Cancer: Cancer can also cluster in families that do not have an identifiable mutation. This may be due to shared environmental or lifestyle factors or genetic risk factors that have not been identified or cannot be detected using current technology or panels.     The likelihood of having a hereditary cancer risk depends on many factors including who in the family had cancer, what type of cancer they had, their age at diagnosis, cancer specifics (such as MMR status of an endometrial or colon cancer or type of breast cancer), and previous genetic testing in the family. Typically, the chance is higher for families that have multiple people with the same or related cancers, an individual with multiple cancers, younger ages of cancer, and certain types of cancer (such as pancreatic or triple negative breast cancer).      Possible Results:    Positive (pathogenic or likely pathogenic variant): A genetic variant was found that is suspected or known to impact the function of the gene. The impact of a positive result on an individual's risk of cancer varies based on the gene, specific variant, individual's sex assigned at birth, personal cancer history, other health history (such as surgical history), and family history. A positive result can impact screening and risk management recommendations. However, there are not always available  guidelines for management based on a specific gene variant. Family history and personal risk factors should always be considered. Sometimes, a positive result can also have treatment or reproductive implications.   Negative: No clinically significant variants were reported in the tested genes. A negative result does not indicate that an individual cannot develop cancer or even that the individual is at average risk. An individual may still be at an increased risk for cancer based on personal risk factors or family history. Additionally, there could be a hereditary cancer predisposition that was not included in a chosen panel or is not detected with current technology.   Variant of Uncertain Significance (VUS): A variant was found. However, the lab does not have enough information to determine if the variant is benign (harmless) or pathogenic (impacts the function of the gene). The laboratory may update (reclassify) the variant over time as more information becomes available. When reclassified, most variants of uncertain significance are reclassified to benign/likely benign. Typically, it is not recommended to  based on the presence of a VUS. The chance of finding a VUS varies based on the test performed. Generally, the chance of finding a VUS increases with the number of genes tested and decreases with the amount of testing of that gene (genes that are tested more frequently or for a longer period of time have a lower VUS rate).    POT1  Pathogenic variants in the POT1 gene are associated with a condition known as POT1 tumor predisposition syndrome which is characterized by:  An increased lifetime risk for multiple cutaneous melanomas, chronic lymphocytic leukemia (CLL), angiosarcoma (particularly cardiac angiosarcomas), and gliomas. Additional cancers (e.g., colorectal cancer, thyroid cancer, breast angiosarcomas) have been reported in individuals with POT1-TPD but with very limited evidence.  The  age of onset for first primary cutaneous melanoma ranges from 15 to 80 years. The majority of POT1 associated cancers are diagnosed in adulthood.   POT1-TPD can be suspected in an individual with  One of the POT1-TPD core cancers and a first- or second degree relative with a confirmed POT1-TPD core cancer. POT1-TPD core cancers include cutaneous melanoma, chronic lymphocytic leukemia, angiosarcoma, and glioma.  To date, fewer than than 100 families with a germline POT1 variant have been identified.   Germline POT1 variants were found in 27.3% (6/22) of [id number] LFL families with members affected with angiosarcoma. Germline POT1 variants were also found in 11.4% (4/35) of individuals with an angiosarcoma or cardiac sarcoma without a family history of sarcomas or a family history suggestive of LFL syndrome      Genetic Mutation Inheritance:  When an individual has a gene mutation, their first-degree relatives (parents, children, and siblings) each have a 50% chance of carrying the same mutation. Other, more distant blood relatives can also be at risk of carrying the same mutation. At-risk relatives of an individual with a mutation should consider genetic testing to help determine their risk for cancer.     Genetic Discrimination: The Genetic Information Nondiscrimination Act of 2008 (JOHN) is a [place] federal law that provides some protections against the use of an individual's genetic information by their health insurer and by their employer. Title I of JOHN prohibits most health insurers (except for insurance obtained through a job with the  or the Federal Employees Health Benefits Plan) from utilizing an individual's genetic information to make decisions regarding insurance eligibility or premium charges. Title II of JOHN prohibits covered entities from requesting or requiring the genetic information of employees and applicants and from using said information to make employment decisions. This does not  apply to employers with fewer than 15 employees or to the .  JOHN also does not protect individuals from genetic discrimination by any other type of policy or entity, including but not limited to life insurance, disability insurance, long-term care insurance,  benefits, and  Health Services benefits.    Genetic Testing Options:   Various genetic testing panel options were discussed along with associated benefits, limitations, and risks.   There are several issues to consider regarding multi-gene testing:  Insurance coverage can vary depending on the genetic test panel(s) ordered.  There are limited data and a lack of clear guidelines regarding degree of cancer risk associated with some of the genes assessed and how to communicate and manage risk for carriers of these genes; this issue is compounded by the low incidence rates of hereditary disease; multi-gene tests include moderate-penetrance genes, and they often also include low-penetrance genes for which there are little available data regarding degree of cancer risk and guidelines for risk management.  Increased likelihood of detecting a genetic variant of unknown significance (VUS).  Increased chance of finding genotypically distinct cell lines (i.e., genetic mosaicism) with next-generation sequencing; clones of non-cancerous cell containing certain genetic mutations have been found in healthy adults undergoing multi-gene testing; this phenomenon can often be attributed to clonal hematopoiesis, a condition in which a hematopoietic stem cell begins making blood cells with the same acquired mutation.    The option for DNA only vs DNA+RNA was discussed. Adding RNA has a small chance of helping to clarify a VUS or detecting genetic variants that DNA only cannot (deep intronic variants). However, it must be conducted on a blood sample (not saliva).     Genetic Testing Guidelines: Eldon's reported personal/family history does not meet the genetic  testing criteria established by the National Comprehensive Cancer Network Genetic/Familial High-Risk Assessment: Breast, Ovarian, and Pancreatic version 1.2025 or Genetic/Familial High-Risk Assessment: Colorectal version 1.2024.  Therefore, Eldon was offered germline hereditary cancer genetic testing. Eldon decided to defer testing at this time.     ASSESSMENT / PLAN      Genetic Testing Logistics:  An outside laboratory performs this genetic testing. Genetic testing typically takes 2-3 weeks to after the sample has been received.  There is a potential for the patient to have out-of-pocket costs related to genetic testing.     Genetic Test Information:  Testing lab: TBD   Test panel: TBD   ICD-10 code(s): Z82.49   Verbal informed consent: Obtained   Specimen type: Blood  (Patient denies blood disorders that would necessitate a skin fibroblast specimen)   Specimen collection by: TBD   Specimen collection date: To be scheduled   Results expected by: Approximately 2-3 weeks after the genetic testing lab receives the specimen   Results disclosure plan: Post-test visit if positive or complex result; otherwise, results will be communicated by phone call      Follow-up: Eldon has deferred testing at this time. A portal message will be sent for coordination of testing if/when Eldon decides to proceed with testing.     Eldon appeared to have a good understanding of the information. Questions were encouraged and answered to the patient's satisfaction, and he verbalized understanding of the information and agreement with the plan.   Eldon is welcome to contact me if he has any questions, concerns, or updates to his family history.     This assessment is based on the history and reports provided, as well as the current scientific knowledge regarding cancer genetics.     Eros Coppola, MGC, [facility] [place]  Certified Genetic Counselor  Department of Hematology and Oncology  [facility]    CC:  Dr. Alex SAAB  Joseph

## 2025-02-08 ENCOUNTER — HOSPITAL ENCOUNTER (EMERGENCY)
Facility: HOSPITAL | Age: 30
Discharge: HOME | End: 2025-02-08
Attending: EMERGENCY MEDICINE
Payer: COMMERCIAL

## 2025-02-08 VITALS
OXYGEN SATURATION: 97 % | DIASTOLIC BLOOD PRESSURE: 69 MMHG | HEIGHT: 70 IN | RESPIRATION RATE: 16 BRPM | TEMPERATURE: 99 F | BODY MASS INDEX: 27.2 KG/M2 | SYSTOLIC BLOOD PRESSURE: 138 MMHG | WEIGHT: 190 LBS | HEART RATE: 79 BPM

## 2025-02-08 DIAGNOSIS — K63.89 EPIPLOIC APPENDAGITIS: Primary | ICD-10-CM

## 2025-02-08 LAB
ALBUMIN SERPL BCP-MCNC: 4 G/DL (ref 3.5–5.2)
ALP SERPL-CCNC: 77 U/L (ref 40–150)
ALT SERPL W/O P-5'-P-CCNC: 17 U/L (ref 10–44)
ANION GAP SERPL CALC-SCNC: 11 MMOL/L (ref 8–16)
AST SERPL-CCNC: 22 U/L (ref 10–40)
BASOPHILS # BLD AUTO: 0.07 K/UL (ref 0–0.2)
BASOPHILS NFR BLD: 0.7 % (ref 0–1.9)
BILIRUB SERPL-MCNC: 0.6 MG/DL (ref 0.1–1)
BILIRUB UR QL STRIP: NEGATIVE
BUN SERPL-MCNC: 15 MG/DL (ref 6–20)
CALCIUM SERPL-MCNC: 9.1 MG/DL (ref 8.7–10.5)
CHLORIDE SERPL-SCNC: 110 MMOL/L (ref 95–110)
CLARITY UR: CLEAR
CO2 SERPL-SCNC: 19 MMOL/L (ref 23–29)
COLOR UR: YELLOW
CREAT SERPL-MCNC: 0.9 MG/DL (ref 0.5–1.4)
DIFFERENTIAL METHOD BLD: ABNORMAL
EOSINOPHIL # BLD AUTO: 0.5 K/UL (ref 0–0.5)
EOSINOPHIL NFR BLD: 4.7 % (ref 0–8)
ERYTHROCYTE [DISTWIDTH] IN BLOOD BY AUTOMATED COUNT: 12.7 % (ref 11.5–14.5)
EST. GFR  (NO RACE VARIABLE): >60 ML/MIN/1.73 M^2
GLUCOSE SERPL-MCNC: 90 MG/DL (ref 70–110)
GLUCOSE UR QL STRIP: NEGATIVE
HCT VFR BLD AUTO: 45.9 % (ref 40–54)
HGB BLD-MCNC: 15.5 G/DL (ref 14–18)
HGB UR QL STRIP: NEGATIVE
IMM GRANULOCYTES # BLD AUTO: 0.04 K/UL (ref 0–0.04)
IMM GRANULOCYTES NFR BLD AUTO: 0.4 % (ref 0–0.5)
KETONES UR QL STRIP: NEGATIVE
LEUKOCYTE ESTERASE UR QL STRIP: NEGATIVE
LIPASE SERPL-CCNC: 26 U/L (ref 4–60)
LYMPHOCYTES # BLD AUTO: 3.1 K/UL (ref 1–4.8)
LYMPHOCYTES NFR BLD: 29.8 % (ref 18–48)
MCH RBC QN AUTO: 31 PG (ref 27–31)
MCHC RBC AUTO-ENTMCNC: 33.8 G/DL (ref 32–36)
MCV RBC AUTO: 92 FL (ref 82–98)
MONOCYTES # BLD AUTO: 1.1 K/UL (ref 0.3–1)
MONOCYTES NFR BLD: 10.3 % (ref 4–15)
NEUTROPHILS # BLD AUTO: 5.7 K/UL (ref 1.8–7.7)
NEUTROPHILS NFR BLD: 54.1 % (ref 38–73)
NITRITE UR QL STRIP: NEGATIVE
NRBC BLD-RTO: 0 /100 WBC
PH UR STRIP: 6 [PH] (ref 5–8)
PLATELET # BLD AUTO: 297 K/UL (ref 150–450)
PMV BLD AUTO: 11.1 FL (ref 9.2–12.9)
POTASSIUM SERPL-SCNC: 4 MMOL/L (ref 3.5–5.1)
PROT SERPL-MCNC: 7.3 G/DL (ref 6–8.4)
PROT UR QL STRIP: NEGATIVE
RBC # BLD AUTO: 5 M/UL (ref 4.6–6.2)
SODIUM SERPL-SCNC: 140 MMOL/L (ref 136–145)
SP GR UR STRIP: 1.02 (ref 1–1.03)
URN SPEC COLLECT METH UR: NORMAL
UROBILINOGEN UR STRIP-ACNC: NEGATIVE EU/DL
WBC # BLD AUTO: 10.5 K/UL (ref 3.9–12.7)

## 2025-02-08 PROCEDURE — 99285 EMERGENCY DEPT VISIT HI MDM: CPT | Mod: 25

## 2025-02-08 PROCEDURE — 85025 COMPLETE CBC W/AUTO DIFF WBC: CPT | Performed by: PHYSICIAN ASSISTANT

## 2025-02-08 PROCEDURE — 83690 ASSAY OF LIPASE: CPT | Performed by: PHYSICIAN ASSISTANT

## 2025-02-08 PROCEDURE — 25500020 PHARM REV CODE 255: Performed by: EMERGENCY MEDICINE

## 2025-02-08 PROCEDURE — 81003 URINALYSIS AUTO W/O SCOPE: CPT | Performed by: PHYSICIAN ASSISTANT

## 2025-02-08 PROCEDURE — 80053 COMPREHEN METABOLIC PANEL: CPT | Performed by: PHYSICIAN ASSISTANT

## 2025-02-08 RX ORDER — KETOROLAC TROMETHAMINE 30 MG/ML
15 INJECTION, SOLUTION INTRAMUSCULAR; INTRAVENOUS
Status: DISCONTINUED | OUTPATIENT
Start: 2025-02-08 | End: 2025-02-08

## 2025-02-08 RX ADMIN — IOHEXOL 100 ML: 350 INJECTION, SOLUTION INTRAVENOUS at 04:02

## 2025-02-08 NOTE — DISCHARGE INSTRUCTIONS
The cause of your pain is something called epiploic appendagitis.  Usually this goes away on its own. You can take Tylenol 1 gram every 8 hours, and ibuprofen 800 mg every 8 hours; this means you can take pain medicine once every 4 hours.    You do not need antibiotics.  However, if you have a fever, worsening pain, can not stop vomiting, or new or worsening symptoms, you need to come back to the hospital.

## 2025-02-08 NOTE — FIRST PROVIDER EVALUATION
Emergency Department TeleTriage Encounter Note      CHIEF COMPLAINT    Chief Complaint   Patient presents with    Abdominal Pain     C/o L sided ABD pain worsening over 3 days. Pt repots increased sensation to have a BM w/ decreased output. Taking gas-x w/o releif. Denies urinary changes.        VITAL SIGNS   Initial Vitals [02/08/25 1432]   BP Pulse Resp Temp SpO2   138/69 79 16 98.5 °F (36.9 °C) 97 %      MAP       --            ALLERGIES    Review of patient's allergies indicates:  No Known Allergies    PROVIDER TRIAGE NOTE  Patient presents with left abdominal pain. No nausea, vomiting or diarrhea. No urinary symptoms.       ORDERS  Labs Reviewed - No data to display    ED Orders (720h ago, onward)      None              Virtual Visit Note: The provider triage portion of this emergency department evaluation and documentation was performed via [employer], a HIPAA-compliant telemedicine application, in concert with a tele-presenter in the room. A face to face patient evaluation with one of my colleagues will occur once the patient is placed in an emergency department room.      DISCLAIMER: This note was prepared with [employer] voice recognition transcription software. Garbled syntax, mangled pronouns, and other bizarre constructions may be attributed to that software system.

## 2025-02-08 NOTE — ED PROVIDER NOTES
Emergency Department Encounter  Provider Note  Encounter Date: 2/8/2025    Patient Name: Eldon Donald  MRN: [id number]    History of Present Illness   HPI  History of Present Illness:    Chief Complaint:   Chief Complaint   Patient presents with    Abdominal Pain     C/o L sided ABD pain worsening over 3 days. Pt repots increased sensation to have a BM w/ decreased output. Taking gas-x w/o releif. Denies urinary changes.      29-year-old male presenting with 3 days of left lower quadrant pain without any other symptoms.  Tried Gas-X without improvement of symptoms.  Denies any history of abdominal surgery.    The following PMH/PSH/SocHx/FamHx has been reviewed by myself:  Past Medical History:   Diagnosis Date    PUD (peptic ulcer disease)      Past Surgical History:   Procedure Laterality Date    polynoidal cyst  09/2017     Social History     Tobacco Use    Smoking status: Former     Types: Cigarettes    Smokeless tobacco: Former     Quit date: 6/15/2016   Substance Use Topics    Alcohol use: Yes    Drug use: Yes     Types: Marijuana     Family History   Problem Relation Name Age of Onset    Recurrent abdominal pain Father Kristopher     Cancer Father Kristopher 52        cardiovascular angiosarcoma    COPD Maternal Grandmother Ines     Lung cancer Maternal Grandmother Ines         former smoker    Skin cancer Maternal Grandfather          sun exposure    Diabetes Maternal Grandfather      Epilepsy Sister Buffy     Cancer Paternal Uncle Paulo         possibly pancreatic    Epilepsy Maternal Aunt      Intellectual disability Maternal Aunt       Allergies reviewed:  Review of patient's allergies indicates:  No Known Allergies  Medications reviewed:  Medication List with Changes/Refills   Current Medications    AZELASTINE (ASTELIN) 137 MCG (0.1 %) NASAL SPRAY    1 spray (137 mcg total) by Nasal route 2 (two) times daily as needed for Rhinitis.    LORATADINE (CLARITIN) 10 MG TABLET    Take 1 tablet (10 mg total) by mouth once  daily.       Physical Exam     Initial Vitals [02/08/25 1432]   BP Pulse Resp Temp SpO2   138/69 79 16 98.5 °F (36.9 °C) 97 %      MAP       --           Triage vital signs reviewed.    Constitutional: Well-nourished, well-developed. Not in acute distress.  HENT: Normocephalic, atraumatic. Moist mucous membranes.  Eyes: No conjunctival injection.  Resp: Normal respiratory effort, breathing unlabored.  Cardio: Regular rate and rhythm.  GI: Abdomen non-distended.  Left lower quadrant tenderness to palpation, non peritoneal but quite tender.  MSK: Extremities without any deformities noted. No lower extremity edema.  Skin: Warm and dry. No rashes or lesions noted.  Neuro: Awake and alert. Moves all extremities.    ED Course   Procedures    Medical Decision Making    History Acquisition     The history is provided by the patient.     Review of prior external/non ED notes:  History of PUD    Differential Diagnoses   Based on available information and initial assessment, the working Differential Diagnosis includes, but is not limited to:  AAA, aortic dissection, mesenteric ischemia, perforated viscous, MI/ACS, SBO/volvulus, incarcerated/strangulated hernia, intussusception, ileus, appendicitis, cholecystitis, cholangitis, diverticulitis, esophagitis, hepatitis, nephrolithiasis, pancreatitis, gastroenteritis, colitis, IBD/IBS, biliary colic, GERD, PUD, constipation, UTI/pyelonephritis,  disorder.    EKG   EKG ordered and independently reviewed by me:     Labs   Lab tests ordered and independently reviewed by me:    Labs Reviewed   CBC W/ AUTO DIFFERENTIAL - Abnormal       Result Value    WBC 10.50      RBC 5.00      Hemoglobin 15.5      Hematocrit 45.9      MCV 92      MCH 31.0      MCHC 33.8      RDW 12.7      Platelets 297      MPV 11.1      Immature Granulocytes 0.4      Gran # (ANC) 5.7      Immature Grans (Abs) 0.04      Lymph # 3.1      Mono # 1.1 (*)     Eos # 0.5      Baso # 0.07      nRBC 0      Gran % 54.1       Lymph % 29.8      Mono % 10.3      Eosinophil % 4.7      Basophil % 0.7      Differential Method Automated     COMPREHENSIVE METABOLIC PANEL - Abnormal    Sodium 140      Potassium 4.0      Chloride 110      CO2 19 (*)     Glucose 90      BUN 15      Creatinine 0.9      Calcium 9.1      Total Protein 7.3      Albumin 4.0      Total Bilirubin 0.6      Alkaline Phosphatase 77      AST 22      ALT 17      eGFR >60      Anion Gap 11     LIPASE    Lipase 26     URINALYSIS, REFLEX TO URINE CULTURE    Specimen UA Urine, Clean Catch      Color, UA Yellow      Appearance, UA Clear      pH, UA 6.0      Specific Gravity, UA 1.025      Protein, UA Negative      Glucose, UA Negative      Ketones, UA Negative      Bilirubin (UA) Negative      Occult Blood UA Negative      Nitrite, UA Negative      Urobilinogen, UA Negative      Leukocytes, UA Negative      Narrative:     Specimen Source->Urine       Imaging   Imaging ordered and independently reviewed by me:   Imaging Results              CT Abdomen Pelvis With IV Contrast NO Oral Contrast (Final result)  Result time 02/08/25 17:10:22      Final result by Timoteo Colorado MD (02/08/25 17:10:22)                   Impression:      1. Findings concerning for left lower quadrant epiploic appendagitis, typically a self-limited illness.  2. No significant colonic diverticular disease, bowel obstruction or evidence of acute bowel inflammation.      Electronically signed by: Timoteo Colorado MD  Date:    02/08/2025  Time:    17:10               Narrative:    EXAMINATION:  CT ABDOMEN PELVIS WITH IV CONTRAST    CLINICAL HISTORY:  LLQ abdominal pain;    TECHNIQUE:  Low dose axial images, sagittal and coronal reformations were obtained from the lung bases to the pubic symphysis following the IV administration of 100 mL of Omnipaque 350 .  Oral contrast was not given.    COMPARISON:  Chest radiograph 01/02/2020    FINDINGS:  Imaged lung bases are clear.  Base of the heart is within normal  limits.    Liver is normal in size noting small geographic area of focal fatty infiltration at the anterior left lobe.  Portal vasculature appears patent.    Gallbladder, pancreas, spleen, stomach, duodenum and bilateral adrenal glands are within normal limits.  No significant biliary ductal dilatation.    Bilateral kidneys are normal in size, shape and location with symmetric normal enhancement.  No hydronephrosis or significant perinephric stranding.  Ureters are normal in course and caliber.  Urinary bladder is well distended without wall thickening or adjacent inflammatory change.  Prostate and seminal vesicles are within normal limits.  Pelvic phleboliths noted.    Appendix and terminal ileum are within normal limits.  No bowel obstruction.  No significant bowel wall thickening.  Within the peripheral margin of the left lower quadrant along the anterior aspect of the descending colon, there is a 0.9 x 2.4 cm fat density ovoid structure with thin enhancing rim, mild adjacent inflammatory change and mild thickening of the adjacent peritoneum.  Trace volume likely reactive free fluid tracks along the distal left pericolic gutter.  No bowel pneumatosis or portal venous gas.    No upper abdominal ascites.  No free air.  No lymphadenopathy by CT criteria.  No significant atherosclerosis.  No aortic aneurysm or dissection.    Tiny fat containing umbilical hernia.  Osseous structures appear intact.                                         Additional Consideration   Eldon Donald  presents to the Emergency Department today with LLQ abd pain. Labs, CT, dispo pending results     Additional testing considered but not indicated during the course of this workup: further imaging and labwork, not indicated  Co-morbidities/chronic illness/exacerbation of chronic illness considered which impacted clinical decision making: none  Procedures done in the ED or plan for the OR: No  Social determinants of care considered during  development of treatment plan include: Decreased medical literacy  Discussion of management or test interpretation with external provider: No  DNR discussion: No    The patient's list of active medications and allergies as documented per RN staff has been reviewed.  Medications given in the ED and/or prescribed:   Medications   iohexoL (OMNIPAQUE 350) injection 100 mL (100 mLs Intravenous Given 2/8/25 1609)             ED Course as of 02/08/25 1731   Sat Feb 08, 2025   1545 CBC W/ AUTO DIFFERENTIAL(!)  Independently interpreted by me; unremarkable or consistent with the patient's baseline   [CS]   1545 Comp. Metabolic Panel(!)  Independently interpreted by me; unremarkable or consistent with the patient's baseline   [CS]   1545 Urinalysis, Reflex to Urine Culture Urine, Clean Catch  Independently interpreted by me; unremarkable or consistent with the patient's baseline   [CS]   1545 Lipase: 26 [CS]   1717 CT Abdomen Pelvis With IV Contrast NO Oral Contrast  appendagitis [CS]      ED Course User Index  [CS] Neida Faust MD       Explanation of disposition: Discharge    Clinical Impression:     1. Epiploic appendagitis         All results from the workup were reviewed with the patient/family in detail. I discussed with the patient and/or the family/caregiver that today's evaluation in the ED does not suggest any emergent or life-threatening medical conditions that would require hospitalization or immediate intervention beyond what was provided in the ED. I believe the patient is safe for discharge.  However, a reassuring evaluation in the ED does not preclude the presence or development of a serious or life-threatening condition. As such, strict return precautions were discussed with the patient expressing understanding of instructions, and all questions answered. The patient/family communicates understanding of all this information and all remaining questions and concerns were addressed at this time.    The  patient/family has been provided with verbal and printed direction regarding our final diagnosis(es) as well as instructions regarding use of OTC and/or Rx medications intended to manage the patient's aforementioned conditions including:  ED Prescriptions    None       The patient's condition does not warrant review of the  and prescription of controlled substances.      ED Disposition Condition    Discharge Stable               Neida Faust MD  02/08/25 3313